# Patient Record
Sex: FEMALE | Race: WHITE | NOT HISPANIC OR LATINO | Employment: FULL TIME | URBAN - METROPOLITAN AREA
[De-identification: names, ages, dates, MRNs, and addresses within clinical notes are randomized per-mention and may not be internally consistent; named-entity substitution may affect disease eponyms.]

---

## 2019-08-15 ENCOUNTER — TELEPHONE (OUTPATIENT)
Dept: OBSTETRICS AND GYNECOLOGY | Facility: CLINIC | Age: 39
End: 2019-08-15

## 2019-08-15 NOTE — TELEPHONE ENCOUNTER
Spoke with pt. Pt states she was seen in the ER yesterday for left sided pain and they referred her to OBGYN for possible ovarian cyst rupture. Pt would like next available appt to be seen. Offered pt appt with Naya Orellana NP. appt scheduled. Pt verbalized understanding.

## 2019-08-15 NOTE — TELEPHONE ENCOUNTER
----- Message from Celsa De León sent at 8/15/2019  3:15 PM CDT -----  Contact: pt    Name of Who is Calling:JOHN JEFF [[id number]]    What is the request in detail: Patient would like a call back regarding a sooner appointment first available September 4,2019..... Please contact to further discuss and advise     Can the clinic reply by [facility]: no    What Number [facility] Back [facility]: [phone]

## 2019-08-16 ENCOUNTER — OFFICE VISIT (OUTPATIENT)
Dept: OBSTETRICS AND GYNECOLOGY | Facility: CLINIC | Age: 39
End: 2019-08-16

## 2019-08-16 VITALS
DIASTOLIC BLOOD PRESSURE: 130 MMHG | SYSTOLIC BLOOD PRESSURE: 178 MMHG | HEIGHT: 64 IN | WEIGHT: 203.38 LBS | BODY MASS INDEX: 34.72 KG/M2

## 2019-08-16 DIAGNOSIS — I16.0 HYPERTENSIVE URGENCY: ICD-10-CM

## 2019-08-16 DIAGNOSIS — R10.12 LUQ PAIN: Primary | ICD-10-CM

## 2019-08-16 DIAGNOSIS — R07.81 RIB PAIN ON LEFT SIDE: ICD-10-CM

## 2019-08-16 PROCEDURE — 99203 OFFICE O/P NEW LOW 30 MIN: CPT | Mod: S$PBB,,, | Performed by: NURSE PRACTITIONER

## 2019-08-16 PROCEDURE — 99213 OFFICE O/P EST LOW 20 MIN: CPT | Mod: PBBFAC,PN | Performed by: NURSE PRACTITIONER

## 2019-08-16 PROCEDURE — 99203 PR OFFICE/OUTPT VISIT, NEW, LEVL III, 30-44 MIN: ICD-10-PCS | Mod: S$PBB,,, | Performed by: NURSE PRACTITIONER

## 2019-08-16 PROCEDURE — 99999 PR PBB SHADOW E&M-EST. PATIENT-LVL III: CPT | Mod: PBBFAC,,, | Performed by: NURSE PRACTITIONER

## 2019-08-16 PROCEDURE — 99999 PR PBB SHADOW E&M-EST. PATIENT-LVL III: ICD-10-PCS | Mod: PBBFAC,,, | Performed by: NURSE PRACTITIONER

## 2019-08-16 RX ORDER — PANTOPRAZOLE SODIUM 40 MG/1
40 TABLET, DELAYED RELEASE ORAL DAILY
Qty: 30 TABLET | Refills: 11 | Status: SHIPPED | OUTPATIENT
Start: 2019-08-16 | End: 2021-10-06

## 2019-08-16 NOTE — PROGRESS NOTES
CC: LUQ Pain Referral from ED    HPI: Pt is a 38 y.o.  female who presents for LUQ pain x 3 days. Pt was seen in ED 8/14/19 for LUQ. CT abd/pelvis unremarkable. Pain is located at the area of left ribs 10-11, midaxillary line. Pain is described as intermittent stabbing, 10/10. Denies radiation. Pain is present at rest and worsened when inhaling. Patient has tried ibuprofen and tylenol with no relief. Denies fever, hematuria, nausea, vomiting, diarrhea, constipation, black/bloody stools, and red/ coffee ground emesis. Patient reports at least 6 shots of rum nightly. Patient smokes cigarettes.   Patient reports bruising that is present is from her applying pressure with her hand for relief.   Patient denies any pelvic or vaginal complaints.     ROS:  GENERAL: Feeling well aside from pain. Denies fever or chills.   SKIN: Denies rash or lesions.   HEAD: Denies head injury or headache.   NODES: Denies enlarged lymph nodes.   CHEST: Denies chest pain or shortness of breath.   CARDIOVASCULAR: Denies palpitations or left sided chest pain.   ABDOMEN: No abdominal pain, constipation, diarrhea, nausea, vomiting or rectal bleeding.   URINARY: No dysuria, hematuria, or frequency.  REPRODUCTIVE: Denies vaginal itching, discharge, odor, or lesion.  HEMATOLOGIC: No easy bruisability or excessive bleeding.   MUSCULOSKELETAL: Denies joint pain or swelling.   NEUROLOGIC: Denies syncope or weakness.   PSYCHIATRIC: Denies depression, anxiety or mood swings.    PE:   APPEARANCE: Well nourished, well developed, White female in obvious distress.    CHEST: unlabored breathing, Guarding breaths when moving.  SKIN: 5x5cm bruising at left ribs 10-11, midaxillary line- area of pain.   MUSCULOSKELETAL: CVA tenderness to left side.       Diagnosis:  1. LUQ pain    2. Rib pain on left side    3. Hypertensive urgency        Plan:     Orders Placed This Encounter    XR Ribs Min 3 Views w/PA Chest Left    pantoprazole (PROTONIX) 40 MG tablet        Plan:  Urine dip- trace blood and protein  ED: Hypertensive Urgency- diastolic >120 x 2 45 minutes apart  Rib xray to r/o fracture  Protonix   F/U with PCP on Monday  F/U at later time for WWE

## 2021-10-06 ENCOUNTER — OFFICE VISIT (OUTPATIENT)
Dept: PRIMARY CARE CLINIC | Facility: CLINIC | Age: 41
End: 2021-10-06
Payer: MEDICAID

## 2021-10-06 VITALS
HEART RATE: 62 BPM | OXYGEN SATURATION: 98 % | SYSTOLIC BLOOD PRESSURE: 160 MMHG | HEIGHT: 64 IN | BODY MASS INDEX: 35.51 KG/M2 | DIASTOLIC BLOOD PRESSURE: 112 MMHG | TEMPERATURE: 98 F | WEIGHT: 208 LBS

## 2021-10-06 DIAGNOSIS — Z11.59 NEED FOR HEPATITIS C SCREENING TEST: ICD-10-CM

## 2021-10-06 DIAGNOSIS — R73.09 ELEVATED GLUCOSE: ICD-10-CM

## 2021-10-06 DIAGNOSIS — F41.9 ANXIETY: ICD-10-CM

## 2021-10-06 DIAGNOSIS — Z11.4 ENCOUNTER FOR SCREENING FOR HIV: ICD-10-CM

## 2021-10-06 DIAGNOSIS — I10 HYPERTENSION, UNSPECIFIED TYPE: ICD-10-CM

## 2021-10-06 DIAGNOSIS — Z76.89 ENCOUNTER TO ESTABLISH CARE: Primary | ICD-10-CM

## 2021-10-06 PROCEDURE — 93005 ELECTROCARDIOGRAM TRACING: CPT | Mod: PBBFAC,PN | Performed by: INTERNAL MEDICINE

## 2021-10-06 PROCEDURE — 99213 OFFICE O/P EST LOW 20 MIN: CPT | Mod: PBBFAC,PN | Performed by: STUDENT IN AN ORGANIZED HEALTH CARE EDUCATION/TRAINING PROGRAM

## 2021-10-06 PROCEDURE — 99204 PR OFFICE/OUTPT VISIT, NEW, LEVL IV, 45-59 MIN: ICD-10-PCS | Mod: S$PBB,,, | Performed by: STUDENT IN AN ORGANIZED HEALTH CARE EDUCATION/TRAINING PROGRAM

## 2021-10-06 PROCEDURE — 99999 PR PBB SHADOW E&M-EST. PATIENT-LVL III: ICD-10-PCS | Mod: PBBFAC,,, | Performed by: STUDENT IN AN ORGANIZED HEALTH CARE EDUCATION/TRAINING PROGRAM

## 2021-10-06 PROCEDURE — 99999 PR PBB SHADOW E&M-EST. PATIENT-LVL III: CPT | Mod: PBBFAC,,, | Performed by: STUDENT IN AN ORGANIZED HEALTH CARE EDUCATION/TRAINING PROGRAM

## 2021-10-06 PROCEDURE — 93010 EKG 12-LEAD: ICD-10-PCS | Mod: S$PBB,,, | Performed by: INTERNAL MEDICINE

## 2021-10-06 PROCEDURE — 99204 OFFICE O/P NEW MOD 45 MIN: CPT | Mod: S$PBB,,, | Performed by: STUDENT IN AN ORGANIZED HEALTH CARE EDUCATION/TRAINING PROGRAM

## 2021-10-06 PROCEDURE — 93010 ELECTROCARDIOGRAM REPORT: CPT | Mod: S$PBB,,, | Performed by: INTERNAL MEDICINE

## 2021-10-06 RX ORDER — SERTRALINE HYDROCHLORIDE 50 MG/1
50 TABLET, FILM COATED ORAL DAILY
Qty: 30 TABLET | Refills: 5 | Status: SHIPPED | OUTPATIENT
Start: 2021-10-06 | End: 2021-10-26

## 2021-10-06 RX ORDER — LOSARTAN POTASSIUM 50 MG/1
100 TABLET ORAL DAILY
Qty: 60 TABLET | Refills: 2 | Status: SHIPPED | OUTPATIENT
Start: 2021-10-06 | End: 2022-01-18

## 2021-10-06 RX ORDER — HYDROXYZINE HYDROCHLORIDE 50 MG/1
50 TABLET, FILM COATED ORAL 2 TIMES DAILY PRN
Qty: 60 TABLET | Refills: 3 | Status: SHIPPED | OUTPATIENT
Start: 2021-10-06 | End: 2022-12-13

## 2021-10-26 ENCOUNTER — OFFICE VISIT (OUTPATIENT)
Dept: PRIMARY CARE CLINIC | Facility: CLINIC | Age: 41
End: 2021-10-26
Payer: MEDICAID

## 2021-10-26 VITALS
OXYGEN SATURATION: 99 % | SYSTOLIC BLOOD PRESSURE: 118 MMHG | RESPIRATION RATE: 16 BRPM | TEMPERATURE: 98 F | BODY MASS INDEX: 33.76 KG/M2 | DIASTOLIC BLOOD PRESSURE: 80 MMHG | HEIGHT: 64 IN | HEART RATE: 74 BPM | WEIGHT: 197.75 LBS

## 2021-10-26 DIAGNOSIS — Z01.818 PREOP EXAMINATION: ICD-10-CM

## 2021-10-26 DIAGNOSIS — D75.89 MACROCYTOSIS WITHOUT ANEMIA: ICD-10-CM

## 2021-10-26 DIAGNOSIS — I10 HYPERTENSION, UNSPECIFIED TYPE: Primary | ICD-10-CM

## 2021-10-26 DIAGNOSIS — R06.83 SNORING: ICD-10-CM

## 2021-10-26 DIAGNOSIS — Z78.9 ALCOHOL USE: ICD-10-CM

## 2021-10-26 DIAGNOSIS — F41.9 ANXIETY: ICD-10-CM

## 2021-10-26 PROCEDURE — 99999 PR PBB SHADOW E&M-EST. PATIENT-LVL IV: ICD-10-PCS | Mod: PBBFAC,,, | Performed by: STUDENT IN AN ORGANIZED HEALTH CARE EDUCATION/TRAINING PROGRAM

## 2021-10-26 PROCEDURE — 99214 OFFICE O/P EST MOD 30 MIN: CPT | Mod: S$PBB,,, | Performed by: STUDENT IN AN ORGANIZED HEALTH CARE EDUCATION/TRAINING PROGRAM

## 2021-10-26 PROCEDURE — 99214 OFFICE O/P EST MOD 30 MIN: CPT | Mod: PBBFAC,PN | Performed by: STUDENT IN AN ORGANIZED HEALTH CARE EDUCATION/TRAINING PROGRAM

## 2021-10-26 PROCEDURE — 99214 PR OFFICE/OUTPT VISIT, EST, LEVL IV, 30-39 MIN: ICD-10-PCS | Mod: S$PBB,,, | Performed by: STUDENT IN AN ORGANIZED HEALTH CARE EDUCATION/TRAINING PROGRAM

## 2021-10-26 PROCEDURE — 99999 PR PBB SHADOW E&M-EST. PATIENT-LVL IV: CPT | Mod: PBBFAC,,, | Performed by: STUDENT IN AN ORGANIZED HEALTH CARE EDUCATION/TRAINING PROGRAM

## 2021-10-26 RX ORDER — SERTRALINE HYDROCHLORIDE 25 MG/1
25 TABLET, FILM COATED ORAL DAILY
Qty: 30 TABLET | Refills: 2 | Status: SHIPPED | OUTPATIENT
Start: 2021-10-26 | End: 2022-12-13

## 2022-01-11 DIAGNOSIS — I10 HYPERTENSION, UNSPECIFIED TYPE: ICD-10-CM

## 2022-01-11 NOTE — TELEPHONE ENCOUNTER
----- Message from Oscar Grimes sent at 1/11/2022  4:11 PM CST -----  Contact: [phone] pt  Requesting an RX refill or new RX.  Is this a refill or new RX: new  RX name and strength (copy/paste from chart): losartan (COZAAR) 50 MG tablet  Is this a 30 day or 90 day RX:   Patient advised that in the future they can use their [facility] account to request a refill?: call back  Pharmacy name and phone # (copy/paste from chart):    [id number] [address] [address]  Phone: [phone] Fax: [phone]     Comments: Please advise

## 2022-01-11 NOTE — TELEPHONE ENCOUNTER
No new care gaps identified.  Powered by [employer]. Reference number: [id number].   1/11/2022 4:18:54 PM CST

## 2022-01-13 DIAGNOSIS — I10 HYPERTENSION, UNSPECIFIED TYPE: ICD-10-CM

## 2022-01-13 NOTE — TELEPHONE ENCOUNTER
----- Message from Aman Hernandez sent at 1/13/2022  4:33 PM CST -----  Contact: pt  Requesting an RX refill or new RX.  Is this a refill or new RX: refill  RX name and strength (copy/paste from chart): losartan (COZAAR) 50 MG tablet  Is this a 30 day or 90 day RX:   Patient advised that in the future they can use their [facility] account to request a refill?: yes  Pharmacy name and phone # (copy/paste from chart):    [id number] - [place] [address] [address] [address]  Phone: [phone] Fax: [phone]      Comments: pt stated she sent in a request a few days ago and the Rx still has not been called in.Please advise

## 2022-01-13 NOTE — TELEPHONE ENCOUNTER
No new care gaps identified.  Powered by [employer]. Reference number: [id number].   1/13/2022 4:43:24 PM CST

## 2022-01-18 ENCOUNTER — TELEPHONE (OUTPATIENT)
Dept: PRIMARY CARE CLINIC | Facility: CLINIC | Age: 42
End: 2022-01-18
Payer: MEDICAID

## 2022-01-18 RX ORDER — VALSARTAN 160 MG/1
160 TABLET ORAL DAILY
Qty: 90 TABLET | Refills: 1 | Status: SHIPPED | OUTPATIENT
Start: 2022-01-18 | End: 2022-08-19 | Stop reason: SDUPTHER

## 2022-01-18 RX ORDER — LOSARTAN POTASSIUM 50 MG/1
100 TABLET ORAL DAILY
Qty: 180 TABLET | Refills: 2 | OUTPATIENT
Start: 2022-01-18

## 2022-01-18 NOTE — TELEPHONE ENCOUNTER
Refill Routing Note   Medication(s) are not appropriate for processing by [facility] for the following reason(s):      - Medication is a new start (<3 months)    [facility] action(s):  Defer          --->Care Gap information included in message below if applicable.   Medication reconciliation completed: No   Automatic Epic Generated Protocol Data:        Requested Prescriptions   Pending Prescriptions Disp Refills    losartan (COZAAR) 50 MG tablet 180 tablet 2     Sig: Take 2 tablets (100 mg total) by mouth once daily.       Cardiovascular:  Angiotensin Receptor Blockers Passed - 1/11/2022  4:18 PM        Passed - Patient is at least 18 years old        Passed - Negative Pregnancy Status Check        Passed - Last BP in normal range within 360 days     BP Readings from Last 1 Encounters:   10/26/21 118/80               Passed - Valid encounter within last 15 months     Recent Visits  Date Type Provider Dept   10/26/21 Office Visit Daniel Mejia MD [facility]   10/06/21 Office Visit Daniel Mejia MD [facility]   Showing recent visits within past 720 days and meeting all other requirements  Future Appointments  No visits were found meeting these conditions.  Showing future appointments within next 150 days and meeting all other requirements      Future Appointments              In 2 weeks Daniel Mejia MD [facility] [address] [place] [facility]                Passed - Cr is 1.39 or below and within 360 days     Lab Results   Component Value Date    CREATININE 0.7 10/06/2021    CREATININE 0.7 08/16/2019    CREATININE 0.7 08/14/2019              Passed - K is 5.2 or below and within 360 days     Potassium   Date Value Ref Range Status   10/06/2021 3.9 3.5 - 5.1 mmol/L Final   08/16/2019 4.1 3.5 - 5.1 mmol/L Final   08/14/2019 3.8 3.5 - 5.1 mmol/L Final              Passed - eGFR within 360 days     Lab Results   Component Value Date    EGFRNONAA >60.0  10/06/2021    EGFRNONAA >60.0 08/16/2019    EGFRNONAA >60.0 08/14/2019                      Appointments  past 12m or future 3m with PCP    Date Provider   Last Visit   10/26/2021 Daniel Mejia MD   Next Visit   1/13/2022 Daniel Mejia MD   ED visits in past 90 days: 0        Note composed:1:28 PM 01/18/2022

## 2022-01-18 NOTE — TELEPHONE ENCOUNTER
[facility] is out of Losartan 50 mg. And 100 mg.   Patient last got them in October, 30 day supply with 3 refills.  Can you switch her to something else and send to [facility].

## 2022-01-21 ENCOUNTER — PATIENT MESSAGE (OUTPATIENT)
Dept: ADMINISTRATIVE | Facility: HOSPITAL | Age: 42
End: 2022-01-21
Payer: MEDICAID

## 2022-01-24 RX ORDER — LOSARTAN POTASSIUM 50 MG/1
100 TABLET ORAL DAILY
Qty: 60 TABLET | Refills: 2 | OUTPATIENT
Start: 2022-01-24

## 2022-01-25 NOTE — TELEPHONE ENCOUNTER
Quick DC. Request already responded to by other means (e.g. phone or fax)   Ordering Encounter Report    Associated Reports   View Encounter     Pt switched to valsartan

## 2022-03-14 DIAGNOSIS — Z12.31 OTHER SCREENING MAMMOGRAM: ICD-10-CM

## 2022-05-31 ENCOUNTER — PATIENT MESSAGE (OUTPATIENT)
Dept: ADMINISTRATIVE | Facility: HOSPITAL | Age: 42
End: 2022-05-31
Payer: MEDICAID

## 2022-07-11 ENCOUNTER — PATIENT MESSAGE (OUTPATIENT)
Dept: ADMINISTRATIVE | Facility: HOSPITAL | Age: 42
End: 2022-07-11
Payer: MEDICAID

## 2022-08-19 DIAGNOSIS — I10 HYPERTENSION, UNSPECIFIED TYPE: ICD-10-CM

## 2022-08-19 RX ORDER — VALSARTAN 160 MG/1
160 TABLET ORAL DAILY
Qty: 90 TABLET | Refills: 0 | Status: SHIPPED | OUTPATIENT
Start: 2022-08-19 | End: 2022-12-13 | Stop reason: SDUPTHER

## 2022-08-19 NOTE — TELEPHONE ENCOUNTER
----- Message from Candice Gomez sent at 8/19/2022 10:52 AM CDT -----  Contact: Self/[phone]  Pt said that she is calling in regards to needing to check the status of a refill on her high blood pressure medication ( Pt stated that she does not know the name ). Please advise

## 2022-08-19 NOTE — TELEPHONE ENCOUNTER
Care Due:                  Date            Visit Type   Department     Provider  --------------------------------------------------------------------------------                                EP -                              PRIMARY      [facility]  Last Visit: 10-      CARE ([facility])   PRIMARY CARE   Daniel Mejia  Next Visit: None Scheduled  None         None Found                                                            Last  Test          Frequency    Reason                     Performed    Due Date  --------------------------------------------------------------------------------    Office Visit  12 months..  sertraline, valsartan....  10-   10-    CMP.........  12 months..  valsartan................  10-   10-    Health [facility] Embedded Care Gaps. Reference number: [id number]. 8/19/2022   11:37:41 AM CDT

## 2022-10-10 ENCOUNTER — PATIENT MESSAGE (OUTPATIENT)
Dept: ADMINISTRATIVE | Facility: HOSPITAL | Age: 42
End: 2022-10-10
Payer: MEDICAID

## 2022-12-13 ENCOUNTER — OFFICE VISIT (OUTPATIENT)
Dept: PRIMARY CARE CLINIC | Facility: CLINIC | Age: 42
End: 2022-12-13
Payer: MEDICAID

## 2022-12-13 VITALS
WEIGHT: 176.13 LBS | HEART RATE: 80 BPM | RESPIRATION RATE: 16 BRPM | HEIGHT: 64 IN | TEMPERATURE: 97 F | SYSTOLIC BLOOD PRESSURE: 136 MMHG | DIASTOLIC BLOOD PRESSURE: 88 MMHG | BODY MASS INDEX: 30.07 KG/M2 | OXYGEN SATURATION: 99 %

## 2022-12-13 DIAGNOSIS — Z23 NEED FOR VACCINATION: ICD-10-CM

## 2022-12-13 DIAGNOSIS — Z12.31 SCREENING MAMMOGRAM, ENCOUNTER FOR: ICD-10-CM

## 2022-12-13 DIAGNOSIS — Z00.00 HEALTH CARE MAINTENANCE: ICD-10-CM

## 2022-12-13 DIAGNOSIS — Z23 NEED FOR IMMUNIZATION AGAINST INFLUENZA: ICD-10-CM

## 2022-12-13 DIAGNOSIS — Z00.00 ANNUAL PHYSICAL EXAM: Primary | ICD-10-CM

## 2022-12-13 DIAGNOSIS — Z13.6 SCREENING FOR CARDIOVASCULAR CONDITION: ICD-10-CM

## 2022-12-13 DIAGNOSIS — F41.9 ANXIETY: ICD-10-CM

## 2022-12-13 DIAGNOSIS — I10 HYPERTENSION, UNSPECIFIED TYPE: ICD-10-CM

## 2022-12-13 DIAGNOSIS — D75.89 MACROCYTOSIS WITHOUT ANEMIA: ICD-10-CM

## 2022-12-13 PROCEDURE — 90472 IMMUNIZATION ADMIN EACH ADD: CPT | Mod: PBBFAC,PN

## 2022-12-13 PROCEDURE — 1160F PR REVIEW ALL MEDS BY PRESCRIBER/CLIN PHARMACIST DOCUMENTED: ICD-10-PCS | Mod: CPTII,,, | Performed by: STUDENT IN AN ORGANIZED HEALTH CARE EDUCATION/TRAINING PROGRAM

## 2022-12-13 PROCEDURE — 3008F PR BODY MASS INDEX (BMI) DOCUMENTED: ICD-10-PCS | Mod: CPTII,,, | Performed by: STUDENT IN AN ORGANIZED HEALTH CARE EDUCATION/TRAINING PROGRAM

## 2022-12-13 PROCEDURE — 90686 IIV4 VACC NO PRSV 0.5 ML IM: CPT | Mod: PBBFAC,PN

## 2022-12-13 PROCEDURE — 99396 PREV VISIT EST AGE 40-64: CPT | Mod: S$PBB,,, | Performed by: STUDENT IN AN ORGANIZED HEALTH CARE EDUCATION/TRAINING PROGRAM

## 2022-12-13 PROCEDURE — 99396 PR PREVENTIVE VISIT,EST,40-64: ICD-10-PCS | Mod: S$PBB,,, | Performed by: STUDENT IN AN ORGANIZED HEALTH CARE EDUCATION/TRAINING PROGRAM

## 2022-12-13 PROCEDURE — 3079F DIAST BP 80-89 MM HG: CPT | Mod: CPTII,,, | Performed by: STUDENT IN AN ORGANIZED HEALTH CARE EDUCATION/TRAINING PROGRAM

## 2022-12-13 PROCEDURE — 3075F PR MOST RECENT SYSTOLIC BLOOD PRESS GE 130-139MM HG: ICD-10-PCS | Mod: CPTII,,, | Performed by: STUDENT IN AN ORGANIZED HEALTH CARE EDUCATION/TRAINING PROGRAM

## 2022-12-13 PROCEDURE — 1159F MED LIST DOCD IN RCRD: CPT | Mod: CPTII,,, | Performed by: STUDENT IN AN ORGANIZED HEALTH CARE EDUCATION/TRAINING PROGRAM

## 2022-12-13 PROCEDURE — 3079F PR MOST RECENT DIASTOLIC BLOOD PRESSURE 80-89 MM HG: ICD-10-PCS | Mod: CPTII,,, | Performed by: STUDENT IN AN ORGANIZED HEALTH CARE EDUCATION/TRAINING PROGRAM

## 2022-12-13 PROCEDURE — 1160F RVW MEDS BY RX/DR IN RCRD: CPT | Mod: CPTII,,, | Performed by: STUDENT IN AN ORGANIZED HEALTH CARE EDUCATION/TRAINING PROGRAM

## 2022-12-13 PROCEDURE — 1159F PR MEDICATION LIST DOCUMENTED IN MEDICAL RECORD: ICD-10-PCS | Mod: CPTII,,, | Performed by: STUDENT IN AN ORGANIZED HEALTH CARE EDUCATION/TRAINING PROGRAM

## 2022-12-13 PROCEDURE — 99215 OFFICE O/P EST HI 40 MIN: CPT | Mod: PBBFAC,PN | Performed by: STUDENT IN AN ORGANIZED HEALTH CARE EDUCATION/TRAINING PROGRAM

## 2022-12-13 PROCEDURE — 4010F ACE/ARB THERAPY RXD/TAKEN: CPT | Mod: CPTII,,, | Performed by: STUDENT IN AN ORGANIZED HEALTH CARE EDUCATION/TRAINING PROGRAM

## 2022-12-13 PROCEDURE — 3008F BODY MASS INDEX DOCD: CPT | Mod: CPTII,,, | Performed by: STUDENT IN AN ORGANIZED HEALTH CARE EDUCATION/TRAINING PROGRAM

## 2022-12-13 PROCEDURE — 99999 PR PBB SHADOW E&M-EST. PATIENT-LVL V: CPT | Mod: PBBFAC,,, | Performed by: STUDENT IN AN ORGANIZED HEALTH CARE EDUCATION/TRAINING PROGRAM

## 2022-12-13 PROCEDURE — 90714 TD VACC NO PRESV 7 YRS+ IM: CPT | Mod: PBBFAC,PN

## 2022-12-13 PROCEDURE — 3075F SYST BP GE 130 - 139MM HG: CPT | Mod: CPTII,,, | Performed by: STUDENT IN AN ORGANIZED HEALTH CARE EDUCATION/TRAINING PROGRAM

## 2022-12-13 PROCEDURE — 99999 PR PBB SHADOW E&M-EST. PATIENT-LVL V: ICD-10-PCS | Mod: PBBFAC,,, | Performed by: STUDENT IN AN ORGANIZED HEALTH CARE EDUCATION/TRAINING PROGRAM

## 2022-12-13 PROCEDURE — 4010F PR ACE/ARB THEARPY RXD/TAKEN: ICD-10-PCS | Mod: CPTII,,, | Performed by: STUDENT IN AN ORGANIZED HEALTH CARE EDUCATION/TRAINING PROGRAM

## 2022-12-13 RX ORDER — VALSARTAN 160 MG/1
160 TABLET ORAL DAILY
Qty: 90 TABLET | Refills: 4 | Status: SHIPPED | OUTPATIENT
Start: 2022-12-13 | End: 2023-07-11

## 2022-12-13 NOTE — PROGRESS NOTES
Patient was identified by name and . Allergies were reviewed. Administered Influenza and TD vaccine IM using aseptic technique

## 2022-12-13 NOTE — PROGRESS NOTES
"Subjective:           Patient ID: Svetlana Sommer is a 42 y.o. female who presents today with a chief complaint of annual.    Chief Complaint:   Annual Exam and Hypertension      History of Present Illness:    43yo female presenting for annal appt.    Is taking Valsartan 160mg daily and tolerating well.    Reports she does get some palpitations when resting, associates with her hx of valve prolpase.    Denies SOB with exertion or other cardiac complaints.     Anxiety: was started on sertraline and Hydroxyzine last year, but had low energy and thus stopped both.    - has felt anxiety is controlled, not desiring medications at this time.      Hx of macrocytosis on labs last year.  No hx of checking folate or B12.    Review of Systems   Constitutional:  Positive for fatigue. Negative for activity change, fever and unexpected weight change.   HENT:  Negative for congestion, nosebleeds, sinus pressure and sneezing.    Eyes:  Negative for visual disturbance.   Respiratory:  Negative for cough, chest tightness, shortness of breath and wheezing.    Cardiovascular:  Negative for chest pain, palpitations and leg swelling.   Gastrointestinal:  Negative for abdominal distention, constipation and vomiting.   Genitourinary:  Negative for difficulty urinating, dysuria, frequency and urgency.   Musculoskeletal:  Negative for back pain and gait problem.   Skin:  Negative for pallor and rash.   Neurological:  Negative for weakness, numbness and headaches.   Psychiatric/Behavioral:  Positive for decreased concentration and sleep disturbance. Negative for agitation, self-injury and suicidal ideas. The patient is nervous/anxious.          Objective:        Vitals:    12/13/22 1025   BP: 124/82   BP Location: Right arm   Patient Position: Sitting   BP Method: Medium (Manual)   Pulse: 80   Resp: 16   Temp: 97.4 °F (36.3 °C)   TempSrc: Temporal   SpO2: 99%   Weight: 79.9 kg (176 lb 2.4 oz)   Height: 5' 4" (1.626 m)       Body mass index " is 30.24 kg/m².      Physical Exam  Vitals reviewed.   Constitutional:       General: She is not in acute distress.     Appearance: Normal appearance. She is obese. She is not ill-appearing.      Comments: As per BMI.   HENT:      Head: Normocephalic.      Right Ear: External ear normal.      Left Ear: External ear normal.      Mouth/Throat:      Mouth: Mucous membranes are moist.      Pharynx: No posterior oropharyngeal erythema.   Eyes:      Extraocular Movements: Extraocular movements intact.      Conjunctiva/sclera: Conjunctivae normal.   Cardiovascular:      Rate and Rhythm: Normal rate and regular rhythm.      Pulses: Normal pulses.      Heart sounds: Murmur heard.     No gallop.   Pulmonary:      Effort: Pulmonary effort is normal. No respiratory distress.   Abdominal:      General: There is no distension.   Skin:     General: Skin is warm.      Capillary Refill: Capillary refill takes less than 2 seconds.      Coloration: Skin is not jaundiced.      Findings: No lesion.   Neurological:      General: No focal deficit present.      Mental Status: She is alert and oriented to person, place, and time.      Motor: No weakness.      Gait: Gait normal.   Psychiatric:         Mood and Affect: Mood normal.           Lab Results   Component Value Date     10/06/2021    K 3.9 10/06/2021     10/06/2021    CO2 21 (L) 10/06/2021    BUN 10 10/06/2021    CREATININE 0.7 10/06/2021    ANIONGAP 9 10/06/2021     Lab Results   Component Value Date    HGBA1C 5.2 10/06/2021     No results found for: BNP, BNPTRIAGEBLO    Lab Results   Component Value Date    WBC 3.60 (L) 10/06/2021    HGB 14.0 10/06/2021    HCT 41.5 10/06/2021     10/06/2021    GRAN 2.1 10/06/2021    GRAN 58.9 10/06/2021     Lab Results   Component Value Date    CHOL 186 10/06/2021    HDL 56 10/06/2021    LDLCALC 105.2 10/06/2021    TRIG 124 10/06/2021          Current Outpatient Medications:     valsartan (DIOVAN) 160 MG tablet, Take 1 tablet  (160 mg total) by mouth once daily., Disp: 90 tablet, Rfl: 4     Outpatient Encounter Medications as of 12/13/2022   Medication Sig Dispense Refill    [DISCONTINUED] hydrOXYzine (ATARAX) 50 MG tablet Take 1 tablet (50 mg total) by mouth 2 (two) times daily as needed for Anxiety. 60 tablet 3    [DISCONTINUED] sertraline (ZOLOFT) 25 MG tablet Take 1 tablet (25 mg total) by mouth once daily. 30 tablet 2    [DISCONTINUED] valsartan (DIOVAN) 160 MG tablet Take 1 tablet (160 mg total) by mouth once daily. 90 tablet 0    valsartan (DIOVAN) 160 MG tablet Take 1 tablet (160 mg total) by mouth once daily. 90 tablet 4     No facility-administered encounter medications on file as of 12/13/2022.          Assessment:       1. Annual physical exam    2. Need for immunization against influenza    3. Hypertension, unspecified type    4. Anxiety    5. Macrocytosis without anemia    6. Need for vaccination    7. Screening for cardiovascular condition    8. Screening mammogram, encounter for    9. Health care maintenance           Plan:       Annual physical exam    Need for immunization against influenza  -     Influenza - Quadrivalent *Preferred* (6 months+) (PF)    Hypertension, unspecified type  -     Lipid Panel; Future; Expected date: 12/13/2022  -     TSH; Future; Expected date: 12/13/2022  -     valsartan (DIOVAN) 160 MG tablet; Take 1 tablet (160 mg total) by mouth once daily.  Dispense: 90 tablet; Refill: 4    Anxiety  -     CBC Auto Differential; Future; Expected date: 12/13/2022  -     Comprehensive Metabolic Panel; Future; Expected date: 12/13/2022  -     Lipid Panel; Future; Expected date: 12/13/2022  -     TSH; Future; Expected date: 12/13/2022    Macrocytosis without anemia  -     CBC Auto Differential; Future; Expected date: 12/13/2022  -     Ferritin; Future; Expected date: 12/13/2022  -     Iron and TIBC; Future; Expected date: 12/13/2022  -     Vitamin B12; Future; Expected date: 12/13/2022  -     Folate; Future;  Expected date: 12/13/2022    Need for vaccination  -     Influenza - Quadrivalent *Preferred* (6 months+) (PF)  -     (In Office Administered) Td Vaccine - Preservative Free    Screening for cardiovascular condition  -     CBC Auto Differential; Future; Expected date: 12/13/2022  -     Comprehensive Metabolic Panel; Future; Expected date: 12/13/2022  -     Lipid Panel; Future; Expected date: 12/13/2022  -     TSH; Future; Expected date: 12/13/2022    Screening mammogram, encounter for  -     Mammo Digital Screening Bilat; Future; Expected date: 12/13/2023    Health care maintenance  -     Ambulatory referral/consult to Obstetrics / Gynecology; Future; Expected date: 12/20/2022             HTN:   - taking Valsartan 160mg daily and tolerating well.   - continue at this time.   - BP well controlled on rooming, was a bit high with provider check, which could be from anxiety, but was still not high.     Macrocytosis:   - last MCV was 108, previous was 110.  Normal under 98.     - most common causes are low B12 or Folate.    - will recheck level.     - has been feeling some fatigue, so consider B12 supplement. Patient open to injections as option which may be ordered pending lab results.     Anxiety:   - was started on Sertraline and Hydroxyzine last year, make patient too tired.  Stopped meds.   - feeling well right now in general.  Does not feel needs for meds.   - get good sleep, exercise as able, maintain healthy diet.    - pending B12 labs may supplement B12 which can help fatigue.    Health screen:  - do for pap and mammogram.   - mammogram ordered.   - referred to OB for pap.     Vaccine:   - getting flu and tetanus vaccine today.

## 2022-12-13 NOTE — PATIENT INSTRUCTIONS
HTN:   - taking Valsartan 160mg daily and tolerating well.   - continue at this time.   - BP well controlled on rooming, was a bit high with provider check, which could be from anxiety, but was still not high.     Macrocytosis:   - last MCV was 108, previous was 110.  Normal under 98.     - most common causes are low B12 or Folate.    - will recheck level.     - has been feeling some fatigue, so consider B12 supplement. Patient open to injections as option which may be ordered pending lab results.     Anxiety:   - was started on Sertraline and Hydroxyzine last year, make patient too tired.  Stopped meds.   - feeling well right now in general.  Does not feel needs for meds.   - get good sleep, exercise as able, maintain healthy diet.    - pending B12 labs may supplement B12 which can help fatigue.    Health screen:  - do for pap and mammogram.   - mammogram ordered.   - referred to OB for pap.     Vaccine:   - getting flu and tetanus vaccine today.

## 2022-12-16 DIAGNOSIS — E53.8 B12 DEFICIENCY: Primary | ICD-10-CM

## 2022-12-16 DIAGNOSIS — E53.8 FOLATE DEFICIENCY: ICD-10-CM

## 2022-12-16 DIAGNOSIS — D75.89 MACROCYTOSIS WITHOUT ANEMIA: ICD-10-CM

## 2022-12-16 RX ORDER — CYANOCOBALAMIN 1000 UG/ML
1000 INJECTION, SOLUTION INTRAMUSCULAR; SUBCUTANEOUS SEE ADMIN INSTRUCTIONS
Qty: 10 ML | Refills: 0 | Status: SHIPPED | OUTPATIENT
Start: 2022-12-16 | End: 2023-09-08

## 2022-12-16 RX ORDER — SYRINGE W-NEEDLE,DISPOSAB,3 ML 25GX5/8"
1 SYRINGE, EMPTY DISPOSABLE MISCELLANEOUS SEE ADMIN INSTRUCTIONS
COMMUNITY
Start: 2022-12-16

## 2022-12-20 ENCOUNTER — TELEPHONE (OUTPATIENT)
Dept: PRIMARY CARE CLINIC | Facility: CLINIC | Age: 42
End: 2022-12-20
Payer: MEDICAID

## 2022-12-20 NOTE — TELEPHONE ENCOUNTER
----- Message from Lissy Vera MA sent at 12/20/2022  4:38 PM CST -----  Contact: Patient, [phone]    ----- Message -----  From: Hillary Mendez  Sent: 12/20/2022   4:25 PM CST  To: Roberto Sanchez Staff    Patient is returning a phone call.  Who left a message for the patient: Imani  Does patient know what this is regarding:  Lab results  Would you like a call back, or a response through your [facility] portal?:   Call back  Comments:  Missed your call, please call her back. Thanks.

## 2023-01-17 ENCOUNTER — HOSPITAL ENCOUNTER (OUTPATIENT)
Dept: RADIOLOGY | Facility: HOSPITAL | Age: 43
Discharge: HOME | End: 2023-01-17
Attending: STUDENT IN AN ORGANIZED HEALTH CARE EDUCATION/TRAINING PROGRAM
Payer: MEDICAID

## 2023-01-17 DIAGNOSIS — E53.8 FOLATE DEFICIENCY: ICD-10-CM

## 2023-01-17 DIAGNOSIS — E53.8 B12 DEFICIENCY: ICD-10-CM

## 2023-01-17 DIAGNOSIS — Z12.31 SCREENING MAMMOGRAM, ENCOUNTER FOR: ICD-10-CM

## 2023-01-17 DIAGNOSIS — D75.89 MACROCYTOSIS WITHOUT ANEMIA: ICD-10-CM

## 2023-01-17 PROCEDURE — 77067 SCR MAMMO BI INCL CAD: CPT | Mod: TC,PO

## 2023-01-17 NOTE — TELEPHONE ENCOUNTER
No new care gaps identified.  [facility] Embedded Care Gaps. Reference number: [id number]. 1/17/2023   1:12:54 PM CST

## 2023-01-17 NOTE — TELEPHONE ENCOUNTER
----- Message from Hollis Hernandez sent at 1/17/2023 11:06 AM CST -----  Contact: self [phone]  Per pt need refill of b-12 injections.      [id number] - [address] [address]  Phone: [phone] Fax: [phone]    Please call and advise

## 2023-01-18 RX ORDER — SYRINGE W-NEEDLE,DISPOSAB,3 ML 25GX5/8"
1 SYRINGE, EMPTY DISPOSABLE MISCELLANEOUS SEE ADMIN INSTRUCTIONS
COMMUNITY
Start: 2023-01-18

## 2023-01-18 RX ORDER — CYANOCOBALAMIN 1000 UG/ML
1000 INJECTION, SOLUTION INTRAMUSCULAR; SUBCUTANEOUS SEE ADMIN INSTRUCTIONS
Qty: 10 ML | Refills: 0 | OUTPATIENT
Start: 2023-01-18

## 2023-04-03 ENCOUNTER — PATIENT MESSAGE (OUTPATIENT)
Dept: ADMINISTRATIVE | Facility: HOSPITAL | Age: 43
End: 2023-04-03
Payer: MEDICAID

## 2023-06-20 ENCOUNTER — TELEPHONE (OUTPATIENT)
Dept: PRIMARY CARE CLINIC | Facility: CLINIC | Age: 43
End: 2023-06-20
Payer: MEDICAID

## 2023-06-20 NOTE — TELEPHONE ENCOUNTER
----- Message from Joel Baker sent at 6/20/2023  3:35 PM CDT -----  Caller is requesting a sooner appointment.  Caller declined first available appointment listed below.    Caller will not accept being placed on the waitlist and is requesting a message be sent to doctor.         Name of Caller: Patient         When is the first available appointment? 8/11/23         Symptoms: Weight Gain In The Last 2 Months And Unsure Why         Best Call Back Number: [phone]           Additional Information:

## 2023-06-27 ENCOUNTER — PATIENT OUTREACH (OUTPATIENT)
Dept: ADMINISTRATIVE | Facility: HOSPITAL | Age: 43
End: 2023-06-27
Payer: MEDICAID

## 2023-06-27 NOTE — PROGRESS NOTES
Health Maintenance Due   Topic Date Due    Cervical Cancer Screening  Never done    COVID-19 Vaccine (3 - Pfizer series) 07/20/2021        Chart review done.   HM updated.   Immunizations reviewed & updated.   Care Everywhere updated.  [facility] reviewed

## 2023-07-11 ENCOUNTER — OFFICE VISIT (OUTPATIENT)
Dept: PRIMARY CARE CLINIC | Facility: CLINIC | Age: 43
End: 2023-07-11
Payer: MEDICAID

## 2023-07-11 VITALS
WEIGHT: 204.5 LBS | BODY MASS INDEX: 34.91 KG/M2 | HEART RATE: 77 BPM | HEIGHT: 64 IN | TEMPERATURE: 98 F | DIASTOLIC BLOOD PRESSURE: 90 MMHG | OXYGEN SATURATION: 98 % | SYSTOLIC BLOOD PRESSURE: 146 MMHG | RESPIRATION RATE: 16 BRPM

## 2023-07-11 DIAGNOSIS — D75.89 MACROCYTOSIS WITHOUT ANEMIA: ICD-10-CM

## 2023-07-11 DIAGNOSIS — I10 HYPERTENSION, UNSPECIFIED TYPE: Primary | ICD-10-CM

## 2023-07-11 DIAGNOSIS — R63.5 WEIGHT GAIN: ICD-10-CM

## 2023-07-11 PROCEDURE — 99999 PR PBB SHADOW E&M-EST. PATIENT-LVL IV: CPT | Mod: PBBFAC,,, | Performed by: STUDENT IN AN ORGANIZED HEALTH CARE EDUCATION/TRAINING PROGRAM

## 2023-07-11 PROCEDURE — 4010F ACE/ARB THERAPY RXD/TAKEN: CPT | Mod: CPTII,,, | Performed by: STUDENT IN AN ORGANIZED HEALTH CARE EDUCATION/TRAINING PROGRAM

## 2023-07-11 PROCEDURE — 1160F RVW MEDS BY RX/DR IN RCRD: CPT | Mod: CPTII,,, | Performed by: STUDENT IN AN ORGANIZED HEALTH CARE EDUCATION/TRAINING PROGRAM

## 2023-07-11 PROCEDURE — 1159F PR MEDICATION LIST DOCUMENTED IN MEDICAL RECORD: ICD-10-PCS | Mod: CPTII,,, | Performed by: STUDENT IN AN ORGANIZED HEALTH CARE EDUCATION/TRAINING PROGRAM

## 2023-07-11 PROCEDURE — 3080F PR MOST RECENT DIASTOLIC BLOOD PRESSURE >= 90 MM HG: ICD-10-PCS | Mod: CPTII,,, | Performed by: STUDENT IN AN ORGANIZED HEALTH CARE EDUCATION/TRAINING PROGRAM

## 2023-07-11 PROCEDURE — 1160F PR REVIEW ALL MEDS BY PRESCRIBER/CLIN PHARMACIST DOCUMENTED: ICD-10-PCS | Mod: CPTII,,, | Performed by: STUDENT IN AN ORGANIZED HEALTH CARE EDUCATION/TRAINING PROGRAM

## 2023-07-11 PROCEDURE — 3080F DIAST BP >= 90 MM HG: CPT | Mod: CPTII,,, | Performed by: STUDENT IN AN ORGANIZED HEALTH CARE EDUCATION/TRAINING PROGRAM

## 2023-07-11 PROCEDURE — 3077F PR MOST RECENT SYSTOLIC BLOOD PRESSURE >= 140 MM HG: ICD-10-PCS | Mod: CPTII,,, | Performed by: STUDENT IN AN ORGANIZED HEALTH CARE EDUCATION/TRAINING PROGRAM

## 2023-07-11 PROCEDURE — 3008F BODY MASS INDEX DOCD: CPT | Mod: CPTII,,, | Performed by: STUDENT IN AN ORGANIZED HEALTH CARE EDUCATION/TRAINING PROGRAM

## 2023-07-11 PROCEDURE — 3077F SYST BP >= 140 MM HG: CPT | Mod: CPTII,,, | Performed by: STUDENT IN AN ORGANIZED HEALTH CARE EDUCATION/TRAINING PROGRAM

## 2023-07-11 PROCEDURE — 3008F PR BODY MASS INDEX (BMI) DOCUMENTED: ICD-10-PCS | Mod: CPTII,,, | Performed by: STUDENT IN AN ORGANIZED HEALTH CARE EDUCATION/TRAINING PROGRAM

## 2023-07-11 PROCEDURE — 99999 PR PBB SHADOW E&M-EST. PATIENT-LVL IV: ICD-10-PCS | Mod: PBBFAC,,, | Performed by: STUDENT IN AN ORGANIZED HEALTH CARE EDUCATION/TRAINING PROGRAM

## 2023-07-11 PROCEDURE — 99214 OFFICE O/P EST MOD 30 MIN: CPT | Mod: PBBFAC,PN | Performed by: STUDENT IN AN ORGANIZED HEALTH CARE EDUCATION/TRAINING PROGRAM

## 2023-07-11 PROCEDURE — 4010F PR ACE/ARB THEARPY RXD/TAKEN: ICD-10-PCS | Mod: CPTII,,, | Performed by: STUDENT IN AN ORGANIZED HEALTH CARE EDUCATION/TRAINING PROGRAM

## 2023-07-11 PROCEDURE — 99214 OFFICE O/P EST MOD 30 MIN: CPT | Mod: S$PBB,,, | Performed by: STUDENT IN AN ORGANIZED HEALTH CARE EDUCATION/TRAINING PROGRAM

## 2023-07-11 PROCEDURE — 1159F MED LIST DOCD IN RCRD: CPT | Mod: CPTII,,, | Performed by: STUDENT IN AN ORGANIZED HEALTH CARE EDUCATION/TRAINING PROGRAM

## 2023-07-11 PROCEDURE — 99214 PR OFFICE/OUTPT VISIT, EST, LEVL IV, 30-39 MIN: ICD-10-PCS | Mod: S$PBB,,, | Performed by: STUDENT IN AN ORGANIZED HEALTH CARE EDUCATION/TRAINING PROGRAM

## 2023-07-11 RX ORDER — VALSARTAN 320 MG/1
320 TABLET ORAL DAILY
Qty: 90 TABLET | Refills: 3 | Status: SHIPPED | OUTPATIENT
Start: 2023-07-11

## 2023-07-11 NOTE — PATIENT INSTRUCTIONS
Weight Gain:   - discussed with patient today that she has had 20 lb weight gain since December, despite having poorly fitting dentures and feeling she has eating much lower volume of food.   - states she drinks coffee with cream and water frequently, then has 1 meal per day, does not urge volumes.  Frequently as eating last with oil or vinegar.   - not clear why patient would be gaining weight with this limited diet.   - checking TSH, blood count, peripheral smear.   - keep a food diary to verify intake.     Macrocytosis without anemia:   - patient's last MCV was 119, blood count level was normal.   - had B12 and Folate deficiency, supplemented for last 6 months.    - will advise getting smear.    HTN:    - increased Valsartan 160mg to 320mg.   - recheck at f/u appt.

## 2023-07-11 NOTE — PROGRESS NOTES
Subjective:           Patient ID: Svetlana Sommer is a 42 y.o. female who presents today with a chief complaint of Night Sweats (X3 months), Insomnia, and Weight Gain.    Chief Complaint:   Night Sweats (X3 months), Insomnia, and Weight Gain      History of Present Illness:    42 y.o. female who presents today with a chief complaint of Night Sweats (X3 months), Insomnia, and Weight Gain.    States has gained weight despite not eating.  Has gotten dentures that don't fit right so worried will be worse when get correct fitting ones.     Mostly drinks coffee and water.  Drinks coffee with lots of cream, no sugar.   States only eats once a day.  Lately has been kishor lettuce with oil/vinegar.  Confused how gaining with only eating that.     Weight:  10/6/21  - 208  10/26/21 - 197  12/13/22 - 176  (states was not able to eat at this time needing dentures)  7/11/23 - 204    So up 28lbs since December.    Appetite varies.  Energy level varies.  Two seem a bit correlated, more energy also has more appetite.     Some nights cannot sleep and wakes drenched in sweat.   No bowel issues.     Review of Systems   Constitutional:  Positive for fatigue. Negative for activity change, fever and unexpected weight change.   HENT:  Negative for congestion, nosebleeds, sinus pressure and sneezing.    Eyes:  Negative for visual disturbance.   Respiratory:  Negative for cough, chest tightness, shortness of breath and wheezing.    Cardiovascular:  Negative for chest pain, palpitations and leg swelling.   Gastrointestinal:  Negative for abdominal distention, constipation and vomiting.   Genitourinary:  Negative for difficulty urinating, dysuria, frequency and urgency.   Musculoskeletal:  Negative for back pain and gait problem.   Skin:  Negative for pallor and rash.   Neurological:  Negative for weakness, numbness and headaches.   Psychiatric/Behavioral:  Positive for decreased concentration and sleep disturbance. Negative for agitation,  "self-injury and suicidal ideas. The patient is nervous/anxious.          Objective:        Vitals:    07/11/23 1327   BP: (!) 140/100   BP Location: Right arm   Patient Position: Sitting   BP Method: Medium (Manual)   Pulse: 77   Resp: 16   Temp: 98 °F (36.7 °C)   TempSrc: Temporal   SpO2: 98%   Weight: 92.8 kg (204 lb 7.6 oz)   Height: 5' 4" (1.626 m)       Body mass index is 35.1 kg/m².      Physical Exam  Vitals reviewed.   Constitutional:       General: She is not in acute distress.     Appearance: Normal appearance. She is obese. She is not ill-appearing.      Comments: As per BMI.   HENT:      Head: Normocephalic.      Right Ear: External ear normal.      Left Ear: External ear normal.      Mouth/Throat:      Mouth: Mucous membranes are moist.      Pharynx: No posterior oropharyngeal erythema.   Eyes:      Extraocular Movements: Extraocular movements intact.      Conjunctiva/sclera: Conjunctivae normal.   Cardiovascular:      Rate and Rhythm: Normal rate and regular rhythm.      Pulses: Normal pulses.      Heart sounds: Murmur heard.     No gallop.   Pulmonary:      Effort: Pulmonary effort is normal. No respiratory distress.   Abdominal:      General: There is no distension.   Skin:     General: Skin is warm.      Capillary Refill: Capillary refill takes less than 2 seconds.      Coloration: Skin is not jaundiced.      Findings: No lesion.   Neurological:      General: No focal deficit present.      Mental Status: She is alert and oriented to person, place, and time.      Motor: No weakness.      Gait: Gait normal.   Psychiatric:         Mood and Affect: Mood normal.           Lab Results   Component Value Date     12/13/2022    K 3.8 12/13/2022     12/13/2022    CO2 23 12/13/2022    BUN 9 12/13/2022    CREATININE 0.7 12/13/2022    ANIONGAP 9 12/13/2022     Lab Results   Component Value Date    HGBA1C 5.2 10/06/2021     No results found for: BNP, BNPTRIAGEBLO    Lab Results   Component Value Date " "   WBC 5.53 12/13/2022    HGB 14.1 12/13/2022    HCT 40.3 12/13/2022     (L) 12/13/2022    GRAN 3.6 12/13/2022    GRAN 64.6 12/13/2022     Lab Results   Component Value Date    CHOL 155 12/13/2022    HDL 68 12/13/2022    LDLCALC 70.0 12/13/2022    TRIG 85 12/13/2022          Current Outpatient Medications:     cyanocobalamin 1,000 mcg/mL injection, Inject 1 mL (1,000 mcg total) into the muscle As instructed (1ml weekly x 10, then every 3-4 weeks.)., Disp: 10 mL, Rfl: 0    needle, disp, 25 gauge 25 gauge x 3/4" Ndle, 1 Units by Misc.(Non-Drug; Combo Route) route As instructed (use with syringe.)., Disp: 10 each, Rfl: 0    syringe with needle (SYRINGE 3CC/25GX1") 3 mL 25 gauge x 1" Syrg, 1 Syringe by Misc.(Non-Drug; Combo Route) route As instructed (1ml to Deltoid Muscle or shoulder weekly x10 then every 3-4 weeks.)., Disp: , Rfl:     valsartan (DIOVAN) 320 MG tablet, Take 1 tablet (320 mg total) by mouth once daily., Disp: 90 tablet, Rfl: 3     Outpatient Encounter Medications as of 7/11/2023   Medication Sig Dispense Refill    cyanocobalamin 1,000 mcg/mL injection Inject 1 mL (1,000 mcg total) into the muscle As instructed (1ml weekly x 10, then every 3-4 weeks.). 10 mL 0    needle, disp, 25 gauge 25 gauge x 3/4" Ndle 1 Units by Misc.(Non-Drug; Combo Route) route As instructed (use with syringe.). 10 each 0    syringe with needle (SYRINGE 3CC/25GX1") 3 mL 25 gauge x 1" Syrg 1 Syringe by Misc.(Non-Drug; Combo Route) route As instructed (1ml to Deltoid Muscle or shoulder weekly x10 then every 3-4 weeks.).      [DISCONTINUED] valsartan (DIOVAN) 160 MG tablet Take 1 tablet (160 mg total) by mouth once daily. 90 tablet 4    valsartan (DIOVAN) 320 MG tablet Take 1 tablet (320 mg total) by mouth once daily. 90 tablet 3     No facility-administered encounter medications on file as of 7/11/2023.          Assessment:       1. Hypertension, unspecified type    2. Weight gain    3. Macrocytosis without anemia         "   Plan:       Hypertension, unspecified type  -     CBC Auto Differential; Future; Expected date: 07/11/2023  -     Comprehensive Metabolic Panel; Future; Expected date: 07/11/2023  -     TSH; Future; Expected date: 07/11/2023  -     Folate; Future; Expected date: 07/11/2023  -     Vitamin B12; Future; Expected date: 07/11/2023  -     valsartan (DIOVAN) 320 MG tablet; Take 1 tablet (320 mg total) by mouth once daily.  Dispense: 90 tablet; Refill: 3    Weight gain  -     CBC Auto Differential; Future; Expected date: 07/11/2023  -     Comprehensive Metabolic Panel; Future; Expected date: 07/11/2023  -     TSH; Future; Expected date: 07/11/2023  -     Folate; Future; Expected date: 07/11/2023  -     Vitamin B12; Future; Expected date: 07/11/2023  -     Pathologist Interpretation Differential; Future; Expected date: 07/11/2023    Macrocytosis without anemia  -     CBC Auto Differential; Future; Expected date: 07/11/2023  -     Comprehensive Metabolic Panel; Future; Expected date: 07/11/2023  -     TSH; Future; Expected date: 07/11/2023  -     Folate; Future; Expected date: 07/11/2023  -     Vitamin B12; Future; Expected date: 07/11/2023  -     Pathologist Interpretation Differential; Future; Expected date: 07/11/2023                   Weight Gain:   - discussed with patient today that she has had 20 lb weight gain since December, despite having poorly fitting dentures and feeling she has eating much lower volume of food.   - states she drinks coffee with cream and water frequently, then has 1 meal per day, does not urge volumes.  Frequently as eating last with oil or vinegar.   - not clear why patient would be gaining weight with this limited diet.   - checking TSH, blood count, peripheral smear.   - keep a food diary to verify intake.     Macrocytosis without anemia:   - patient's last MCV was 119, blood count level was normal.   - had B12 and Folate deficiency, supplemented for last 6 months.    - will advise getting  smear.    HTN:    - increased Valsartan 160mg to 320mg.   - recheck at f/u appt.

## 2023-07-25 ENCOUNTER — PATIENT MESSAGE (OUTPATIENT)
Dept: ADMINISTRATIVE | Facility: HOSPITAL | Age: 43
End: 2023-07-25
Payer: MEDICAID

## 2023-07-25 ENCOUNTER — PATIENT OUTREACH (OUTPATIENT)
Dept: ADMINISTRATIVE | Facility: HOSPITAL | Age: 43
End: 2023-07-25
Payer: MEDICAID

## 2023-07-25 NOTE — PROGRESS NOTES
Health Maintenance Due   Topic Date Due    Cervical Cancer Screening  Never done    COVID-19 Vaccine (3 - Pfizer series) 07/20/2021     Immunizations - reviewed and updated   Care Everywhere - triggered   Care Teams - updated   Outreach - Chart review done. Portal message sent to patient in regards to cervical cancer screening. HM updated.

## 2023-09-07 DIAGNOSIS — E53.8 FOLATE DEFICIENCY: ICD-10-CM

## 2023-09-07 DIAGNOSIS — D75.89 MACROCYTOSIS WITHOUT ANEMIA: ICD-10-CM

## 2023-09-07 DIAGNOSIS — E53.8 B12 DEFICIENCY: ICD-10-CM

## 2023-09-08 RX ORDER — CYANOCOBALAMIN 1000 UG/ML
1000 INJECTION, SOLUTION INTRAMUSCULAR; SUBCUTANEOUS SEE ADMIN INSTRUCTIONS
Qty: 10 ML | Refills: 0 | Status: SHIPPED | OUTPATIENT
Start: 2023-09-08

## 2023-09-12 ENCOUNTER — PATIENT OUTREACH (OUTPATIENT)
Dept: ADMINISTRATIVE | Facility: HOSPITAL | Age: 43
End: 2023-09-12
Payer: MEDICAID

## 2023-09-12 NOTE — PROGRESS NOTES
Health Maintenance Due   Topic Date Due    Cervical Cancer Screening  Never done    COVID-19 Vaccine (3 - Pfizer series) 07/20/2021    Influenza Vaccine (1) 09/01/2023        Chart review done.   HM updated.   Immunizations reviewed & updated.   Care Everywhere updated.   [facility] reviewed

## 2023-09-18 ENCOUNTER — PATIENT MESSAGE (OUTPATIENT)
Dept: PRIMARY CARE CLINIC | Facility: CLINIC | Age: 43
End: 2023-09-18
Payer: MEDICAID

## 2023-09-26 ENCOUNTER — OFFICE VISIT (OUTPATIENT)
Dept: PRIMARY CARE CLINIC | Facility: CLINIC | Age: 43
End: 2023-09-26
Payer: MEDICAID

## 2023-09-26 VITALS
BODY MASS INDEX: 35.32 KG/M2 | SYSTOLIC BLOOD PRESSURE: 132 MMHG | RESPIRATION RATE: 16 BRPM | HEART RATE: 65 BPM | WEIGHT: 206.88 LBS | HEIGHT: 64 IN | TEMPERATURE: 99 F | OXYGEN SATURATION: 98 % | DIASTOLIC BLOOD PRESSURE: 88 MMHG

## 2023-09-26 DIAGNOSIS — E66.9 OBESITY, CLASS II, BMI 35-39.9: ICD-10-CM

## 2023-09-26 DIAGNOSIS — I10 HYPERTENSION, UNSPECIFIED TYPE: Primary | ICD-10-CM

## 2023-09-26 DIAGNOSIS — E74.819 DISORDERS OF GLUCOSE TRANSPORT, UNSPECIFIED: ICD-10-CM

## 2023-09-26 DIAGNOSIS — R63.5 WEIGHT GAIN: ICD-10-CM

## 2023-09-26 DIAGNOSIS — E53.8 B12 DEFICIENCY: ICD-10-CM

## 2023-09-26 PROCEDURE — 99396 PREV VISIT EST AGE 40-64: CPT | Mod: S$PBB,,, | Performed by: STUDENT IN AN ORGANIZED HEALTH CARE EDUCATION/TRAINING PROGRAM

## 2023-09-26 PROCEDURE — 99396 PR PREVENTIVE VISIT,EST,40-64: ICD-10-PCS | Mod: S$PBB,,, | Performed by: STUDENT IN AN ORGANIZED HEALTH CARE EDUCATION/TRAINING PROGRAM

## 2023-09-26 PROCEDURE — 99999 PR PBB SHADOW E&M-EST. PATIENT-LVL IV: CPT | Mod: PBBFAC,,, | Performed by: STUDENT IN AN ORGANIZED HEALTH CARE EDUCATION/TRAINING PROGRAM

## 2023-09-26 PROCEDURE — 3079F PR MOST RECENT DIASTOLIC BLOOD PRESSURE 80-89 MM HG: ICD-10-PCS | Mod: CPTII,,, | Performed by: STUDENT IN AN ORGANIZED HEALTH CARE EDUCATION/TRAINING PROGRAM

## 2023-09-26 PROCEDURE — 4010F ACE/ARB THERAPY RXD/TAKEN: CPT | Mod: CPTII,,, | Performed by: STUDENT IN AN ORGANIZED HEALTH CARE EDUCATION/TRAINING PROGRAM

## 2023-09-26 PROCEDURE — 3008F PR BODY MASS INDEX (BMI) DOCUMENTED: ICD-10-PCS | Mod: CPTII,,, | Performed by: STUDENT IN AN ORGANIZED HEALTH CARE EDUCATION/TRAINING PROGRAM

## 2023-09-26 PROCEDURE — 3075F PR MOST RECENT SYSTOLIC BLOOD PRESS GE 130-139MM HG: ICD-10-PCS | Mod: CPTII,,, | Performed by: STUDENT IN AN ORGANIZED HEALTH CARE EDUCATION/TRAINING PROGRAM

## 2023-09-26 PROCEDURE — 4010F PR ACE/ARB THEARPY RXD/TAKEN: ICD-10-PCS | Mod: CPTII,,, | Performed by: STUDENT IN AN ORGANIZED HEALTH CARE EDUCATION/TRAINING PROGRAM

## 2023-09-26 PROCEDURE — 1159F MED LIST DOCD IN RCRD: CPT | Mod: CPTII,,, | Performed by: STUDENT IN AN ORGANIZED HEALTH CARE EDUCATION/TRAINING PROGRAM

## 2023-09-26 PROCEDURE — 3079F DIAST BP 80-89 MM HG: CPT | Mod: CPTII,,, | Performed by: STUDENT IN AN ORGANIZED HEALTH CARE EDUCATION/TRAINING PROGRAM

## 2023-09-26 PROCEDURE — 99999 PR PBB SHADOW E&M-EST. PATIENT-LVL IV: ICD-10-PCS | Mod: PBBFAC,,, | Performed by: STUDENT IN AN ORGANIZED HEALTH CARE EDUCATION/TRAINING PROGRAM

## 2023-09-26 PROCEDURE — 1159F PR MEDICATION LIST DOCUMENTED IN MEDICAL RECORD: ICD-10-PCS | Mod: CPTII,,, | Performed by: STUDENT IN AN ORGANIZED HEALTH CARE EDUCATION/TRAINING PROGRAM

## 2023-09-26 PROCEDURE — 99214 OFFICE O/P EST MOD 30 MIN: CPT | Mod: PBBFAC,PN | Performed by: STUDENT IN AN ORGANIZED HEALTH CARE EDUCATION/TRAINING PROGRAM

## 2023-09-26 PROCEDURE — 3075F SYST BP GE 130 - 139MM HG: CPT | Mod: CPTII,,, | Performed by: STUDENT IN AN ORGANIZED HEALTH CARE EDUCATION/TRAINING PROGRAM

## 2023-09-26 PROCEDURE — 3008F BODY MASS INDEX DOCD: CPT | Mod: CPTII,,, | Performed by: STUDENT IN AN ORGANIZED HEALTH CARE EDUCATION/TRAINING PROGRAM

## 2023-09-26 NOTE — PROGRESS NOTES
Subjective:           Patient ID: Svetlana Sommer is a 43 y.o. female who presents today with a chief complaint of Follow-up (Weight & diet)  .    Chief Complaint:   Follow-up (Weight & diet)      History of Present Illness:    Svetlana Sommer is a 43 y.o. female who presents today with a chief complaint of Follow-up (Weight & diet)  .    Reviewed labs today.   Has been taking B12 shots, last B12 level was now in the normal nange    States she has not been eating much of anything and still is gaining weight.  States she is still eating mostly kishor lettuce (mostly just lettuce, cheese and dip into vinegar or A1 sauce), water and black coffee.  Did eat a piece of BBQ shrimp.     Had started on a probiotic.  No concerns on her digestion.      Weight:  10/6/21  - 208  10/26/21 - 197  12/13/22 - 176  (states was not able to eat at this time needing dentures)  7/11/23 - 204  - reported very limited diet.  9/26/23 - 206 - still endorsing a very limited, mostly lettuce diet.     Review of Systems   Constitutional:  Positive for fatigue. Negative for activity change, fever and unexpected weight change.   HENT:  Negative for congestion, nosebleeds, sinus pressure and sneezing.    Eyes:  Negative for visual disturbance.   Respiratory:  Negative for cough, chest tightness, shortness of breath and wheezing.    Cardiovascular:  Negative for chest pain, palpitations and leg swelling.   Gastrointestinal:  Negative for abdominal distention, constipation and vomiting.   Genitourinary:  Negative for difficulty urinating, dysuria, frequency and urgency.   Musculoskeletal:  Negative for back pain and gait problem.   Skin:  Negative for pallor and rash.   Neurological:  Negative for weakness, numbness and headaches.   Psychiatric/Behavioral:  Positive for sleep disturbance. Negative for agitation, decreased concentration, self-injury and suicidal ideas. The patient is nervous/anxious.            Objective:        Vitals:     "09/26/23 1307   BP: 132/88   BP Location: Right arm   Patient Position: Sitting   BP Method: Medium (Manual)   Pulse: 65   Resp: 16   Temp: 99 °F (37.2 °C)   TempSrc: Temporal   SpO2: 98%   Weight: 93.9 kg (206 lb 14.4 oz)   Height: 5' 4" (1.626 m)       Body mass index is 35.51 kg/m².      Physical Exam  Vitals reviewed.   Constitutional:       General: She is not in acute distress.     Appearance: Normal appearance. She is obese. She is not ill-appearing.      Comments: As per BMI.   HENT:      Head: Normocephalic.      Right Ear: External ear normal.      Left Ear: External ear normal.      Mouth/Throat:      Mouth: Mucous membranes are moist.      Pharynx: No posterior oropharyngeal erythema.   Eyes:      Extraocular Movements: Extraocular movements intact.      Conjunctiva/sclera: Conjunctivae normal.   Cardiovascular:      Rate and Rhythm: Normal rate and regular rhythm.      Pulses: Normal pulses.      Heart sounds: Murmur (slight) heard.      No gallop.   Pulmonary:      Effort: Pulmonary effort is normal. No respiratory distress.   Abdominal:      General: There is no distension.   Musculoskeletal:      Right lower leg: No edema.      Left lower leg: No edema.   Skin:     General: Skin is warm.      Capillary Refill: Capillary refill takes less than 2 seconds.      Coloration: Skin is not jaundiced.      Findings: No lesion.   Neurological:      General: No focal deficit present.      Mental Status: She is alert and oriented to person, place, and time.      Motor: No weakness.      Gait: Gait normal.   Psychiatric:         Mood and Affect: Mood normal.             Lab Results   Component Value Date     07/15/2023    K 4.4 07/15/2023     07/15/2023    CO2 23 07/15/2023    BUN 12 07/15/2023    CREATININE 0.7 07/15/2023    ANIONGAP 8 07/15/2023     Lab Results   Component Value Date    HGBA1C 5.2 10/06/2021     No results found for: "BNP", "BNPTRIAGEBLO"    Lab Results   Component Value Date    WBC " "4.99 07/15/2023    HGB 13.4 07/15/2023    HCT 40.1 07/15/2023     07/15/2023    GRAN 2.8 07/15/2023    GRAN 55.5 07/15/2023     Lab Results   Component Value Date    CHOL 155 12/13/2022    HDL 68 12/13/2022    LDLCALC 70.0 12/13/2022    TRIG 85 12/13/2022          Current Outpatient Medications:     cyanocobalamin 1,000 mcg/mL injection, Inject 1 ml (1,000 mcg total) into the muscle as instructed (1ml weekly x 10, then every 3-4 weeks.)., Disp: 10 mL, Rfl: 0    needle, disp, 25 gauge 25 gauge x 3/4" Ndle, 1 Units by Misc.(Non-Drug; Combo Route) route As instructed (use with syringe.)., Disp: 10 each, Rfl: 0    syringe with needle (SYRINGE 3CC/25GX1") 3 mL 25 gauge x 1" Syrg, 1 Syringe by Misc.(Non-Drug; Combo Route) route As instructed (1ml to Deltoid Muscle or shoulder weekly x10 then every 3-4 weeks.)., Disp: , Rfl:     valsartan (DIOVAN) 320 MG tablet, Take 1 tablet (320 mg total) by mouth once daily., Disp: 90 tablet, Rfl: 3     Outpatient Encounter Medications as of 9/26/2023   Medication Sig Dispense Refill    cyanocobalamin 1,000 mcg/mL injection Inject 1 ml (1,000 mcg total) into the muscle as instructed (1ml weekly x 10, then every 3-4 weeks.). 10 mL 0    needle, disp, 25 gauge 25 gauge x 3/4" Ndle 1 Units by Misc.(Non-Drug; Combo Route) route As instructed (use with syringe.). 10 each 0    syringe with needle (SYRINGE 3CC/25GX1") 3 mL 25 gauge x 1" Syrg 1 Syringe by Misc.(Non-Drug; Combo Route) route As instructed (1ml to Deltoid Muscle or shoulder weekly x10 then every 3-4 weeks.).      valsartan (DIOVAN) 320 MG tablet Take 1 tablet (320 mg total) by mouth once daily. 90 tablet 3    [DISCONTINUED] cyanocobalamin 1,000 mcg/mL injection Inject 1 mL (1,000 mcg total) into the muscle As instructed (1ml weekly x 10, then every 3-4 weeks.). 10 mL 0     No facility-administered encounter medications on file as of 9/26/2023.          Assessment:       1. Hypertension, unspecified type    2. Obesity, Class " II, BMI 35-39.9    3. B12 deficiency    4. Weight gain    5. Disorders of glucose transport, unspecified           Plan:       Hypertension, unspecified type  -     CBC Auto Differential; Future; Expected date: 09/26/2023  -     Comprehensive Metabolic Panel; Future; Expected date: 09/26/2023  -     Lipid Panel; Future; Expected date: 09/26/2023  -     TSH; Future; Expected date: 09/26/2023    Obesity, Class II, BMI 35-39.9  -     Ambulatory referral/consult to Nutrition Services; Future; Expected date: 10/03/2023  -     CBC Auto Differential; Future; Expected date: 09/26/2023  -     Comprehensive Metabolic Panel; Future; Expected date: 09/26/2023  -     Lipid Panel; Future; Expected date: 09/26/2023  -     TSH; Future; Expected date: 09/26/2023  -     Hemoglobin A1C; Future; Expected date: 09/26/2023    B12 deficiency  -     CBC Auto Differential; Future; Expected date: 09/26/2023  -     Comprehensive Metabolic Panel; Future; Expected date: 09/26/2023  -     Lipid Panel; Future; Expected date: 09/26/2023  -     TSH; Future; Expected date: 09/26/2023    Weight gain  -     Ambulatory referral/consult to Nutrition Services; Future; Expected date: 10/03/2023  -     CBC Auto Differential; Future; Expected date: 09/26/2023  -     Comprehensive Metabolic Panel; Future; Expected date: 09/26/2023  -     Lipid Panel; Future; Expected date: 09/26/2023  -     TSH; Future; Expected date: 09/26/2023  -     Hemoglobin A1C; Future; Expected date: 09/26/2023    Disorders of glucose transport, unspecified  -     Hemoglobin A1C; Future; Expected date: 09/26/2023               Hypertension, unspecified type  -     CBC, CMP, THS, Lipids.   - patient blood pressure well controlled on new dose of valsartan.  Continue at this time.    Obesity, Class II, BMI 35-39.9  -     Ambulatory referral/consult to Nutrition Services; Future; Expected date: 10/03/2023  -     CBC, CMP, THS, Lipids.  -     Hemoglobin A1C; Future; Expected date:  09/26/2023   - continue with eating well.   - referral to nutrition to help plan good meals.     B12 deficiency  -     CBC, CMP, THS, Lipids.   - continue supplmentation, anemia has been improving and blood cells getting closer to normal range.     Weight gain  -     Ambulatory referral/consult to Nutrition Services; Future; Expected date: 10/03/2023  -     CBC, CMP, THS, Lipids.  -     Hemoglobin A1C; Future; Expected date: 09/26/2023    Disorders of glucose transport, unspecified  -     Hemoglobin A1C; Future; Expected date: 09/26/2023

## 2023-09-26 NOTE — PATIENT INSTRUCTIONS
Hypertension, unspecified type  -     CBC, CMP, THS, Lipids.   - patient blood pressure well controlled on new dose of valsartan.  Continue at this time.    Obesity, Class II, BMI 35-39.9  -     Ambulatory referral/consult to Nutrition Services; Future; Expected date: 10/03/2023  -     CBC, CMP, THS, Lipids.  -     Hemoglobin A1C; Future; Expected date: 09/26/2023   - continue with eating well.   - referral to nutrition to help plan good meals.     B12 deficiency  -     CBC, CMP, THS, Lipids.   - continue supplmentation, anemia has been improving and blood cells getting closer to normal range.     Weight gain  -     Ambulatory referral/consult to Nutrition Services; Future; Expected date: 10/03/2023  -     CBC, CMP, THS, Lipids.  -     Hemoglobin A1C; Future; Expected date: 09/26/2023    Disorders of glucose transport, unspecified  -     Hemoglobin A1C; Future; Expected date: 09/26/2023

## 2023-10-18 ENCOUNTER — PATIENT MESSAGE (OUTPATIENT)
Dept: CARDIOLOGY | Facility: CLINIC | Age: 43
End: 2023-10-18
Payer: MEDICAID

## 2023-10-24 ENCOUNTER — CLINICAL SUPPORT (OUTPATIENT)
Dept: DIABETES | Facility: CLINIC | Age: 43
End: 2023-10-24
Payer: MEDICAID

## 2023-10-24 VITALS — HEIGHT: 64 IN | BODY MASS INDEX: 35.68 KG/M2 | WEIGHT: 209 LBS

## 2023-10-24 DIAGNOSIS — E66.9 OBESITY, CLASS II, BMI 35-39.9: Primary | ICD-10-CM

## 2023-10-24 PROCEDURE — 99999PBSHW PR PBB SHADOW TECHNICAL ONLY FILED TO HB: ICD-10-PCS | Mod: PBBFAC,,,

## 2023-10-24 PROCEDURE — 99999 PR PBB SHADOW E&M-EST. PATIENT-LVL II: ICD-10-PCS | Mod: PBBFAC,,, | Performed by: DIETITIAN, REGISTERED

## 2023-10-24 PROCEDURE — 99212 OFFICE O/P EST SF 10 MIN: CPT | Mod: PBBFAC,PN | Performed by: DIETITIAN, REGISTERED

## 2023-10-24 PROCEDURE — 97802 MEDICAL NUTRITION INDIV IN: CPT | Mod: 59,PBBFAC,PN | Performed by: DIETITIAN, REGISTERED

## 2023-10-24 PROCEDURE — 99999PBSHW PR PBB SHADOW TECHNICAL ONLY FILED TO HB: Mod: PBBFAC,,,

## 2023-10-24 PROCEDURE — 99999 PR PBB SHADOW E&M-EST. PATIENT-LVL II: CPT | Mod: PBBFAC,,, | Performed by: DIETITIAN, REGISTERED

## 2023-10-25 NOTE — PROGRESS NOTES
"Referring Physician:Daniel Mejia MD    Reason for visit: Weight Management       :1980     Allergies Reviewed  Meds Reviewed    Anthropometrics  Weight:94.8 kg (209 lb)  Height:5' 4" (1.626 m)  BMI:Body mass index is 35.87 kg/m².   IBW: 120 lb    Meds:  Outpatient Medications Prior to Visit   Medication Sig Dispense Refill    cyanocobalamin 1,000 mcg/mL injection Inject 1 ml (1,000 mcg total) into the muscle as instructed (1ml weekly x 10, then every 3-4 weeks.). 10 mL 0    needle, disp, 25 gauge 25 gauge x 3/4" Ndle 1 Units by Misc.(Non-Drug; Combo Route) route As instructed (use with syringe.). 10 each 0    syringe with needle (SYRINGE 3CC/25GX1") 3 mL 25 gauge x 1" Syrg 1 Syringe by Misc.(Non-Drug; Combo Route) route As instructed (1ml to Deltoid Muscle or shoulder weekly x10 then every 3-4 weeks.).      valsartan (DIOVAN) 320 MG tablet Take 1 tablet (320 mg total) by mouth once daily. 90 tablet 3     No facility-administered medications prior to visit.         Labs:   LABORATORY:   A1C:   Hemoglobin A1C   Date Value Ref Range Status   10/06/2021 5.2 4.0 - 5.6 % Final     Comment:     ADA Screening Guidelines:  5.7-6.4%  Consistent with prediabetes  >or=6.5%  Consistent with diabetes    High levels of fetal hemoglobin interfere with the HbA1C  assay. Heterozygous hemoglobin variants (HbS, HgC, etc)do  not significantly interfere with this assay.   However, presence of multiple variants may affect accuracy.       Cholesterol:   Lab Results   Component Value Date    CHOL 155 2022     HDL:    Lab Results   Component Value Date    HDL 68 2022     LDL:    Lab Results   Component Value Date    LDLCALC 70.0 2022     Trig:    Lab Results   Component Value Date    TRIG 85 2022     HDL Cholesterol:   Lab Results   Component Value Date    CHOLHDL 43.9 2022     BUN:      BUN   Date Value Ref Range Status   07/15/2023 12 6 - 20 mg/dL Final     Micro/Cr Ratio:    Creatinine   Date " Value Ref Range Status   07/15/2023 0.7 0.5 - 1.4 mg/dL Final     Protein:    Total Protein   Date Value Ref Range Status   07/15/2023 6.5 6.0 - 8.4 g/dL Final     AST:    AST   Date Value Ref Range Status   07/15/2023 26 10 - 40 U/L Final         ALT:    ALT   Date Value Ref Range Status   07/15/2023 32 10 - 44 U/L Final     Estimated Nutrition Needs: 1600 Kcals/day( 25 kcal/kg ABW),  55-65 g protein( 1.0-1.2 g/kg)     Diet Hx: About a year ago started wearing dentures that were ill fitting and was unable to eat, was surviving on Ensure/Boost  Lost a significant amount of weight, got down to 160 lbs. Now is afraid to eat - she is gaining weight with minimal intake.   Her dentures are still not perfect. She is scheduled to get a better fitting pair but is afraid to get them because she will start eating more and likely gain weight.  Patient is having hot flashes, especially at night, wondering if she is starting menopause and/or has a hormone imbalance.     She states that she has struggled with her weight her entire life.    Breakfast: skipped  Lunch: skipped  Dinner: small serving of squash, carrots, and zucchini   Beverages: water and coffee  Exercise: bike riding and walking a local track each day    Assessment: patient is not getting enough protein in her diet, she could benefit from hormone testing, she needs to get her new dentures so she can eat again.     Nutrition Diagnosis:   Nutrition Problem  Obesity, Class II    Related to (etiology):   Decreased energy needs  Excessive energy intake  Food- and nutrition-related knowledge deficit  Physical inactivity  Increased psychological/life stress    Signs and Symptoms (as evidenced by):   BMI more than normative standard for age and sex: Obese Class II: 35 to 39.9 (adults) BMI 35.87  Waist circumference more than normative standard for age and sex  Increased skinfold thickness  Body fat percentage >32% for women  Estimated excessive energy intake  Infrequent,  low-duration and/or low-intensity physical activity, factors affecting physical activity access  Large amounts of sedentary activities, eg, TV watching, reading, computer use in both leisure and work/school  Inability to lose a significant amount of excess weight through conventional weight loss intervention  Physical disability or limitation    Interventions/Recommendations (treatment strategy):  Nutrition education and counseling to build nutrition related knowledge of obesity and weight lose with bariatric surgery    Nutrition Diagnosis Status:   New    Education provided:   Discussed protein sources like eggs, low-fat cottage cheese, greek yogurt, sliced deli turkey, low-fat sliced cheese, protein drinks and protein bars, foods to avoid/limit such as starchy carbohydrates (bread, rice, pasta, potatoes, corn, grits, oatmeal, etc), planning to have 4-6 small meals a day rather than 3 large meals, measure portion sizes, use smaller bowls and plates, limit eating out to once a week and make low fat, low carbohydrate choices, include fruits and vegetables in daily meal plan, avoid/limit candy and desserts, low fat options (baked, broiled, grilled, and boiled instead of fried, sauteed, creamed), increase physical activity, chew food thoroughly before swallowing, sip beverages don't chug or gulp, no liquids 30 minutes before, during and 30 minutes after meals  Reviewed tips for healthy and conscious eating  Reviewed and patient verbalized understanding    Recommendations:   Start slowly adding protein into diet, will add 1 serving a day for the next 2 weeks (~150 kcal)  Will follow-up in 2 weeks to see where patient is with weight and protein intake  Hormone testing could help determine if she is starting perimenopause or menopause, and if an imbalance is making weight loss harder.  Continue to exercise      Consultation Time:30 minutes.    Follow Up: 2 weeks.

## 2023-10-31 ENCOUNTER — PATIENT MESSAGE (OUTPATIENT)
Dept: ADMINISTRATIVE | Facility: HOSPITAL | Age: 43
End: 2023-10-31
Payer: MEDICAID

## 2023-10-31 ENCOUNTER — PATIENT OUTREACH (OUTPATIENT)
Dept: ADMINISTRATIVE | Facility: HOSPITAL | Age: 43
End: 2023-10-31
Payer: MEDICAID

## 2023-10-31 NOTE — PROGRESS NOTES
Health Maintenance Due   Topic Date Due    Cervical Cancer Screening  Never done    Influenza Vaccine (1) 09/01/2023    COVID-19 Vaccine (3 - 2023-24 season) 09/01/2023    Mammogram  01/17/2024     Immunizations - reviewed and updated   Care Everywhere - triggered   Care Teams - updated   Outreach - Patient overdue for pap smear, portal message sent to schedule with GYN. Available appointments offered with Naya Roman NP as well.

## 2023-11-12 ENCOUNTER — PATIENT MESSAGE (OUTPATIENT)
Dept: DIABETES | Facility: CLINIC | Age: 43
End: 2023-11-12
Payer: MEDICAID

## 2023-11-29 ENCOUNTER — PATIENT MESSAGE (OUTPATIENT)
Dept: ADMINISTRATIVE | Facility: HOSPITAL | Age: 43
End: 2023-11-29
Payer: MEDICAID

## 2024-03-14 DIAGNOSIS — Z12.31 OTHER SCREENING MAMMOGRAM: ICD-10-CM

## 2024-03-18 ENCOUNTER — PATIENT MESSAGE (OUTPATIENT)
Dept: ADMINISTRATIVE | Facility: HOSPITAL | Age: 44
End: 2024-03-18
Payer: MEDICAID

## 2024-07-30 DIAGNOSIS — I10 HYPERTENSION, UNSPECIFIED TYPE: ICD-10-CM

## 2024-07-30 RX ORDER — VALSARTAN 320 MG/1
320 TABLET ORAL DAILY
Qty: 90 TABLET | Refills: 0 | Status: SHIPPED | OUTPATIENT
Start: 2024-07-30

## 2024-07-30 NOTE — TELEPHONE ENCOUNTER
Care Due:                  Date            Visit Type   Department     Provider  --------------------------------------------------------------------------------                                EP -                              PRIMARY      [facility]  Last Visit: 09-      CARE ([facility])   PRIMARY CARE   Daniel Mejia  Next Visit: None Scheduled  None         None Found                                                            Last  Test          Frequency    Reason                     Performed    Due Date  --------------------------------------------------------------------------------    CMP.........  12 months..  valsartan................  07-   07-    Health [facility] Embedded Care Due Messages. Reference number: [id number].   7/30/2024 1:09:30 PM CDT

## 2024-07-30 NOTE — TELEPHONE ENCOUNTER
----- Message from Elda Baker sent at 7/30/2024 11:53 AM CDT -----  Contact: [phone]  Requesting an RX refill or new RX.    Is this a refill or new RX: refill    RX name and strength (copy/paste from chart):  valsartan (DIOVAN) 320 MG tablet    Is this a 30 day or 90 day RX: 90    Pharmacy name and phone # (copy/paste from chart):    [email or web address]  [address] [place] [address] [phone]       The doctors have asked that we provide their patients with the following 2 reminders -- prescription refills can take up to 72 hours, and a friendly reminder that in the future you can use your [facility] account to request refills: yes

## 2024-09-13 DIAGNOSIS — E53.8 B12 DEFICIENCY: ICD-10-CM

## 2024-09-13 DIAGNOSIS — E53.8 FOLATE DEFICIENCY: ICD-10-CM

## 2024-09-13 DIAGNOSIS — D75.89 MACROCYTOSIS WITHOUT ANEMIA: ICD-10-CM

## 2024-09-13 RX ORDER — CYANOCOBALAMIN 1000 UG/ML
1000 INJECTION, SOLUTION INTRAMUSCULAR; SUBCUTANEOUS SEE ADMIN INSTRUCTIONS
Qty: 10 ML | Refills: 0 | Status: SHIPPED | OUTPATIENT
Start: 2024-09-13

## 2024-09-13 NOTE — TELEPHONE ENCOUNTER
----- Message from Mally Patiño sent at 9/13/2024  2:26 PM CDT -----  Contact: Pt  [phone]  Requesting an RX refill or new RX.    Is this a refill or new RX: refill    RX name and strength (copy/paste from chart):  cyanocobalamin 1,000 mcg/mL injection    Is this a 30 day or 90 day RX:     Pharmacy name and phone # (copy/paste from chart):  [facility] - [place], [place] - [address]   Phone: [phone]  Fax: [phone]      The doctors have asked that we provide their patients with the following 2 reminders -- prescription refills can take up to 72 hours, and a friendly reminder that in the future you can use your [facility] account to request refills: yes

## 2024-09-19 ENCOUNTER — PATIENT MESSAGE (OUTPATIENT)
Dept: PRIMARY CARE CLINIC | Facility: CLINIC | Age: 44
End: 2024-09-19
Payer: MEDICAID

## 2024-09-27 ENCOUNTER — OFFICE VISIT (OUTPATIENT)
Dept: PRIMARY CARE CLINIC | Facility: CLINIC | Age: 44
End: 2024-09-27
Payer: MEDICAID

## 2024-09-27 VITALS
BODY MASS INDEX: 35.36 KG/M2 | RESPIRATION RATE: 18 BRPM | DIASTOLIC BLOOD PRESSURE: 82 MMHG | OXYGEN SATURATION: 98 % | SYSTOLIC BLOOD PRESSURE: 124 MMHG | WEIGHT: 207.13 LBS | HEART RATE: 119 BPM | HEIGHT: 64 IN

## 2024-09-27 DIAGNOSIS — R00.0 TACHYCARDIA: ICD-10-CM

## 2024-09-27 DIAGNOSIS — R35.0 URINARY FREQUENCY: ICD-10-CM

## 2024-09-27 DIAGNOSIS — F41.9 ANXIETY: ICD-10-CM

## 2024-09-27 DIAGNOSIS — R25.1 TREMOR: Primary | ICD-10-CM

## 2024-09-27 LAB
BILIRUB SERPL-MCNC: NORMAL MG/DL
BLOOD URINE, POC: NORMAL
CLARITY, POC UA: CLEAR
COLOR, POC UA: NORMAL
GLUCOSE UR QL STRIP: NORMAL
KETONES UR QL STRIP: NEGATIVE
LEUKOCYTE ESTERASE URINE, POC: NEGATIVE
NITRITE, POC UA: NEGATIVE
PH, POC UA: 5
PROTEIN, POC: NEGATIVE
SPECIFIC GRAVITY, POC UA: 1.02
UROBILINOGEN, POC UA: NORMAL

## 2024-09-27 PROCEDURE — 99999 PR PBB SHADOW E&M-EST. PATIENT-LVL III: CPT | Mod: PBBFAC,,, | Performed by: STUDENT IN AN ORGANIZED HEALTH CARE EDUCATION/TRAINING PROGRAM

## 2024-09-27 PROCEDURE — 99213 OFFICE O/P EST LOW 20 MIN: CPT | Mod: PBBFAC,PN | Performed by: STUDENT IN AN ORGANIZED HEALTH CARE EDUCATION/TRAINING PROGRAM

## 2024-09-27 RX ORDER — PROPRANOLOL HYDROCHLORIDE 60 MG/1
60 CAPSULE, EXTENDED RELEASE ORAL DAILY
Qty: 30 CAPSULE | Refills: 11 | Status: SHIPPED | OUTPATIENT
Start: 2024-09-27 | End: 2025-09-27

## 2024-09-27 NOTE — PATIENT INSTRUCTIONS
Tremor  -     propranoloL (INDERAL LA) 60 MG 24 hr capsule; Take 1 capsule (60 mg total) by mouth once daily.  Dispense: 30 capsule; Refill: 11   - patient has been having increased heart rate, some palpitations and flushing at times.   - has had reassuring labs recently.  Normal blood count, thyroid.    - advised we can trial propranolol 60 mg XR daily to see if this reduces tremor and flushing and increased heart rate.   - should notify clinic if getting dizzy or lightheaded.  Otherwise would take on a 1 time per day basis.    Tachycardia  -     propranoloL (INDERAL LA) 60 MG 24 hr capsule; Take 1 capsule (60 mg total) by mouth once daily.  Dispense: 30 capsule; Refill: 11   - as per above.    Anxiety  -     propranoloL (INDERAL LA) 60 MG 24 hr capsule; Take 1 capsule (60 mg total) by mouth once daily.  Dispense: 30 capsule; Refill: 11    - as per above.    Urinary frequency  -     US Bladder Post Void Residual; Future; Expected date: 09/27/2024  -     POCT URINE DIPSTICK WITHOUT MICROSCOPE  - patient is having increased urinary frequency.  States has been an issue for last 6 months.   - Denies dysuria.  Got urine dip in clinic.  No bacteria, no white cells.  Does have normal specific gravity.   - suspicion for possible bladder outlet obstruction or other cause of smaller volume voids.   - getting ultrasound exam with postvoid residual.   - consider flomax or similar treatment based of US results.

## 2024-09-27 NOTE — PROGRESS NOTES
"Subjective:           Patient ID: Svetlana Sommer   Age:  44 y.o.  Sex: female     Chief Complaint:   Annual Exam      History of Present Illness:    Svetlana Sommer is a 44 y.o. female who presents today with a chief complaint of Annual Exam  .    43yo female presenting for annual exam.   States she is having urinary frequency, but with low volume for last 6 months.     Medications as directed.  Blood pressure well controlled today though is significantly tachycardic.    On provider recheck patient's heart rate was down from 119 to 102.  Patient does report having felt palpitations at times.      Review of Systems   Constitutional:  Positive for fatigue. Negative for activity change, fever and unexpected weight change.   HENT:  Negative for congestion, nosebleeds, sinus pressure and sneezing.    Eyes:  Negative for visual disturbance.   Respiratory:  Negative for cough, chest tightness, shortness of breath and wheezing.    Cardiovascular:  Positive for palpitations. Negative for chest pain and leg swelling.   Gastrointestinal:  Negative for abdominal distention, constipation and vomiting.   Genitourinary:  Negative for difficulty urinating, dysuria, frequency and urgency.   Musculoskeletal:  Negative for back pain and gait problem.   Skin:  Negative for pallor and rash.   Neurological:  Negative for weakness, numbness and headaches.   Psychiatric/Behavioral:  Positive for sleep disturbance. Negative for agitation, decreased concentration, self-injury and suicidal ideas. The patient is nervous/anxious.            Objective:        Vitals:    09/27/24 1418   BP: 124/82   BP Location: Right arm   Patient Position: Sitting   BP Method: Medium (Manual)   Pulse: (!) 119   Resp: 18   SpO2: 98%   Weight: 93.9 kg (207 lb 2 oz)   Height: 5' 4" (1.626 m)       Body mass index is 35.55 kg/m².      Physical Exam  Vitals reviewed.   Constitutional:       General: She is not in acute distress.     Appearance: Normal " "appearance. She is obese. She is not ill-appearing.      Comments: As per BMI.   HENT:      Head: Normocephalic.      Right Ear: External ear normal.      Left Ear: External ear normal.      Mouth/Throat:      Mouth: Mucous membranes are moist.      Pharynx: No posterior oropharyngeal erythema.   Eyes:      Extraocular Movements: Extraocular movements intact.      Conjunctiva/sclera: Conjunctivae normal.   Cardiovascular:      Rate and Rhythm: Normal rate and regular rhythm.      Pulses: Normal pulses.      Heart sounds: No murmur heard.     No gallop.   Pulmonary:      Effort: Pulmonary effort is normal. No respiratory distress.   Abdominal:      General: There is no distension.   Musculoskeletal:      Right lower leg: No edema.      Left lower leg: No edema.   Skin:     General: Skin is warm.      Capillary Refill: Capillary refill takes less than 2 seconds.      Coloration: Skin is not jaundiced.      Findings: No lesion.      Comments: Patient's face is generally flushed.   Neurological:      General: No focal deficit present.      Mental Status: She is alert and oriented to person, place, and time.      Motor: No weakness.      Gait: Gait normal.   Psychiatric:         Mood and Affect: Mood normal.           No past medical history on file.    Lab Results   Component Value Date     07/15/2023    K 4.4 07/15/2023     07/15/2023    CO2 23 07/15/2023    BUN 12 07/15/2023    CREATININE 0.7 07/15/2023    ANIONGAP 8 07/15/2023     Lab Results   Component Value Date    HGBA1C 5.2 10/06/2021     No results found for: "BNP", "BNPTRIAGEBLO"    Lab Results   Component Value Date    WBC 4.99 07/15/2023    HGB 13.4 07/15/2023    HCT 40.1 07/15/2023     07/15/2023    GRAN 2.8 07/15/2023    GRAN 55.5 07/15/2023     Lab Results   Component Value Date    CHOL 155 12/13/2022    HDL 68 12/13/2022    LDLCALC 70.0 12/13/2022    TRIG 85 12/13/2022        Outpatient Encounter Medications as of 9/27/2024 " "  Medication Sig Dispense Refill    cyanocobalamin 1,000 mcg/mL injection Inject 1 mL (1,000 mcg total) into the muscle As instructed (1ml weekly x 10, then every 3-4 weeks.). 10 mL 0    needle, disp, 25 gauge 25 gauge x 3/4" Ndle 1 Units by Misc.(Non-Drug; Combo Route) route As instructed (use with syringe.). 10 each 0    syringe with needle (SYRINGE 3CC/25GX1") 3 mL 25 gauge x 1" Syrg 1 Syringe by Misc.(Non-Drug; Combo Route) route As instructed (1ml to Deltoid Muscle or shoulder weekly x10 then every 3-4 weeks.).      valsartan (DIOVAN) 320 MG tablet Take 1 tablet (320 mg total) by mouth once daily. 90 tablet 0    propranoloL (INDERAL LA) 60 MG 24 hr capsule Take 1 capsule (60 mg total) by mouth once daily. 30 capsule 11     No facility-administered encounter medications on file as of 9/27/2024.          Assessment:       1. Tremor    2. Tachycardia    3. Anxiety    4. Urinary frequency           Plan:       Tremor  -     propranoloL (INDERAL LA) 60 MG 24 hr capsule; Take 1 capsule (60 mg total) by mouth once daily.  Dispense: 30 capsule; Refill: 11   - patient has been having increased heart rate, some palpitations and flushing at times.   - has had reassuring labs recently.  Normal blood count, thyroid.    - advised we can trial propranolol 60 mg XR daily to see if this reduces tremor and flushing and increased heart rate.   - should notify clinic if getting dizzy or lightheaded.  Otherwise would take on a 1 time per day basis.    Tachycardia  -     propranoloL (INDERAL LA) 60 MG 24 hr capsule; Take 1 capsule (60 mg total) by mouth once daily.  Dispense: 30 capsule; Refill: 11   - as per above.    Anxiety  -     propranoloL (INDERAL LA) 60 MG 24 hr capsule; Take 1 capsule (60 mg total) by mouth once daily.  Dispense: 30 capsule; Refill: 11    - as per above.    Urinary frequency  -     US Bladder Post Void Residual; Future; Expected date: 09/27/2024  -     POCT URINE DIPSTICK WITHOUT MICROSCOPE  - patient is " having increased urinary frequency.  States has been an issue for last 6 months.   - Denies dysuria.  Got urine dip in clinic.  No bacteria, no white cells.  Does have normal specific gravity.   - suspicion for possible bladder outlet obstruction or other cause of smaller volume voids.   - getting ultrasound exam with postvoid residual.   - consider flomax or similar treatment based of US results.

## 2024-09-30 ENCOUNTER — TELEPHONE (OUTPATIENT)
Dept: PRIMARY CARE CLINIC | Facility: CLINIC | Age: 44
End: 2024-09-30
Payer: MEDICAID

## 2024-09-30 NOTE — TELEPHONE ENCOUNTER
----- Message from Sadaf sent at 9/27/2024  3:47 PM CDT -----  Contact: [phone] Patient  Patient would like to get medical advice.  Symptoms (please be specific):   Pt states she needs clarification on her b/p medication. Pt is asking if she is taking the old and new medications.   How long have you had these symptoms: N/A  Would you like a call back, or a response through your [facility] portal?:    [phone] call back   Pharmacy name and phone # (copy from chart):   N/A  Comments:

## 2024-09-30 NOTE — TELEPHONE ENCOUNTER
----- Message from Christopher sent at 9/30/2024  2:12 PM CDT -----  Regarding: Imani  Contact: Pt [phone]  Type: Returning a call    Who left a message? Imani Foley MA    When did the practice call? Today    Does patient know what this is regarding: No    Would the patient rather a call back or a response via [facility]? Call    Comments:

## 2024-09-30 NOTE — TELEPHONE ENCOUNTER
Spoke to patient about her propanolol and Valsartan.  She is suppose to be taking both medications.  Patient states understanding

## 2024-10-23 DIAGNOSIS — I10 HYPERTENSION, UNSPECIFIED TYPE: ICD-10-CM

## 2024-10-23 RX ORDER — VALSARTAN 320 MG/1
320 TABLET ORAL
Qty: 90 TABLET | Refills: 3 | Status: SHIPPED | OUTPATIENT
Start: 2024-10-23

## 2024-10-23 NOTE — TELEPHONE ENCOUNTER
Care Due:                  Date            Visit Type   Department     Provider  --------------------------------------------------------------------------------                                EP -                              PRIMARY      [facility] [place] [facility]  Last Visit: 09-      CARE ([facility])   PRIMARY CARE   Daniel Mejia  Next Visit: None Scheduled  None         None Found                                                            Last  Test          Frequency    Reason                     Performed    Due Date  --------------------------------------------------------------------------------    CMP.........  12 months..  valsartan................  07-   07-    Health [facility] Embedded Care Due Messages. Reference number: [id number].   10/23/2024 12:43:18 PM CDT

## 2024-10-23 NOTE — TELEPHONE ENCOUNTER
Refill Routing Note   Medication(s) are not appropriate for processing by [facility] for the following reason(s):        Required labs outdated    [facility] action(s):  Defer     Requires labs : Yes             Appointments  past 12m or future 3m with PCP    Date Provider   Last Visit   9/27/2024 Daniel Mejia MD   Next Visit   Visit date not found Daniel Mejia MD   ED visits in past 90 days: 0        Note composed:5:25 PM 10/23/2024

## 2025-03-04 DIAGNOSIS — E53.8 B12 DEFICIENCY: ICD-10-CM

## 2025-03-04 DIAGNOSIS — E53.8 FOLATE DEFICIENCY: ICD-10-CM

## 2025-03-04 DIAGNOSIS — D75.89 MACROCYTOSIS WITHOUT ANEMIA: ICD-10-CM

## 2025-03-05 RX ORDER — CYANOCOBALAMIN 1000 UG/ML
INJECTION, SOLUTION INTRAMUSCULAR; SUBCUTANEOUS
Qty: 10 ML | Refills: 0 | Status: SHIPPED | OUTPATIENT
Start: 2025-03-05

## 2025-04-10 ENCOUNTER — PATIENT OUTREACH (OUTPATIENT)
Dept: ADMINISTRATIVE | Facility: HOSPITAL | Age: 45
End: 2025-04-10

## 2025-04-10 DIAGNOSIS — Z12.12 SCREENING FOR COLORECTAL CANCER: ICD-10-CM

## 2025-04-10 DIAGNOSIS — Z01.419 WELL WOMAN EXAM WITH ROUTINE GYNECOLOGICAL EXAM: Primary | ICD-10-CM

## 2025-04-10 DIAGNOSIS — Z12.31 BREAST CANCER SCREENING BY MAMMOGRAM: ICD-10-CM

## 2025-04-10 DIAGNOSIS — Z12.11 SCREENING FOR COLORECTAL CANCER: ICD-10-CM

## 2025-04-10 NOTE — PROGRESS NOTES
Health Maintenance Due   Topic Date Due    Cervical Cancer Screening  Never done    Mammogram  01/17/2024    Colorectal Cancer Screening  Never done    Hemoglobin A1c (Diabetic Prevention Screening)  10/06/2024     Immunizations - reviewed and updated   Care Everywhere - triggered   Care Teams - updated   Outreach - Mammogram gap report reviewed. Order placed. Called for patient in regards to scheduling, no answer. LM on VM  Referral placed to OB/GYN for cervical cancer screening   Case request for screening colonoscopy placed. Staff message sent to [facility] in regards to scheduling

## 2025-04-11 ENCOUNTER — TELEPHONE (OUTPATIENT)
Dept: GASTROENTEROLOGY | Facility: CLINIC | Age: 45
End: 2025-04-11

## 2025-04-11 ENCOUNTER — PATIENT MESSAGE (OUTPATIENT)
Dept: GASTROENTEROLOGY | Facility: CLINIC | Age: 45
End: 2025-04-11

## 2025-04-11 NOTE — TELEPHONE ENCOUNTER
Attempted to schedule pt for colonoscopy, no answer. 1st attempt made to schedule pt with no success. Follow up reminder created to call pt back in approx. 1 week

## 2025-04-17 ENCOUNTER — TELEPHONE (OUTPATIENT)
Dept: GASTROENTEROLOGY | Facility: CLINIC | Age: 45
End: 2025-04-17

## 2025-04-25 ENCOUNTER — TELEPHONE (OUTPATIENT)
Dept: GASTROENTEROLOGY | Facility: CLINIC | Age: 45
End: 2025-04-25

## 2025-07-10 ENCOUNTER — PATIENT OUTREACH (OUTPATIENT)
Dept: ADMINISTRATIVE | Facility: HOSPITAL | Age: 45
End: 2025-07-10

## 2025-07-10 NOTE — PROGRESS NOTES
Health Maintenance Due   Topic Date Due    Cervical Cancer Screening  Never done    Mammogram  01/17/2024    Colorectal Cancer Screening  Never done    Hemoglobin A1c (Diabetic Prevention Screening)  10/06/2024     Immunizations - reviewed and updated   Care Everywhere - triggered   Care Teams -   Outreach - Breast cancer gap reviewed. Patient does not have insurance right now  Cervical cancer screening appointment canceled due to no insurance  Colon cancer screening - 3 attempts made, no answer

## 2025-08-12 ENCOUNTER — PATIENT MESSAGE (OUTPATIENT)
Dept: INTERNAL MEDICINE | Facility: CLINIC | Age: 45
End: 2025-08-12